# Patient Record
Sex: FEMALE | Race: WHITE | NOT HISPANIC OR LATINO | URBAN - METROPOLITAN AREA
[De-identification: names, ages, dates, MRNs, and addresses within clinical notes are randomized per-mention and may not be internally consistent; named-entity substitution may affect disease eponyms.]

---

## 2017-01-03 ENCOUNTER — HOSPITAL ENCOUNTER (OUTPATIENT)
Dept: PHYSICAL MEDICINE AND REHAB | Facility: CLINIC | Age: 72
Discharge: HOME | End: 2017-01-03
Attending: FAMILY MEDICINE

## 2017-01-03 ENCOUNTER — COMMUNICATION - HEALTHEAST (OUTPATIENT)
Dept: FAMILY MEDICINE | Facility: CLINIC | Age: 72
End: 2017-01-03

## 2017-01-03 DIAGNOSIS — M47.812 FACET ARTHROPATHY, CERVICAL: ICD-10-CM

## 2017-01-03 DIAGNOSIS — M79.18 MYOFASCIAL PAIN: ICD-10-CM

## 2017-01-03 DIAGNOSIS — G47.9 SLEEP DISTURBANCE: ICD-10-CM

## 2017-01-03 DIAGNOSIS — M54.12 CERVICAL RADICULITIS: ICD-10-CM

## 2017-01-03 ASSESSMENT — MIFFLIN-ST. JEOR: SCORE: 1433.02

## 2017-01-06 ENCOUNTER — OFFICE VISIT - HEALTHEAST (OUTPATIENT)
Dept: PHYSICAL THERAPY | Facility: REHABILITATION | Age: 72
End: 2017-01-06

## 2017-01-06 DIAGNOSIS — M54.12 CERVICAL RADICULITIS: ICD-10-CM

## 2017-01-06 DIAGNOSIS — R29.3 ABNORMAL POSTURE: ICD-10-CM

## 2017-01-09 ENCOUNTER — OFFICE VISIT - HEALTHEAST (OUTPATIENT)
Dept: PHYSICAL THERAPY | Facility: REHABILITATION | Age: 72
End: 2017-01-09

## 2017-01-09 DIAGNOSIS — M54.12 CERVICAL RADICULITIS: ICD-10-CM

## 2017-01-09 DIAGNOSIS — R29.3 ABNORMAL POSTURE: ICD-10-CM

## 2017-01-12 ENCOUNTER — OFFICE VISIT - HEALTHEAST (OUTPATIENT)
Dept: PHYSICAL THERAPY | Facility: REHABILITATION | Age: 72
End: 2017-01-12

## 2017-01-12 DIAGNOSIS — M54.12 CERVICAL RADICULITIS: ICD-10-CM

## 2017-01-12 DIAGNOSIS — R29.3 ABNORMAL POSTURE: ICD-10-CM

## 2017-01-13 ENCOUNTER — COMMUNICATION - HEALTHEAST (OUTPATIENT)
Dept: PHYSICAL THERAPY | Facility: REHABILITATION | Age: 72
End: 2017-01-13

## 2017-01-16 ENCOUNTER — OFFICE VISIT - HEALTHEAST (OUTPATIENT)
Dept: PHYSICAL THERAPY | Facility: REHABILITATION | Age: 72
End: 2017-01-16

## 2017-01-16 DIAGNOSIS — R29.3 ABNORMAL POSTURE: ICD-10-CM

## 2017-01-16 DIAGNOSIS — M54.12 CERVICAL RADICULITIS: ICD-10-CM

## 2017-01-17 ENCOUNTER — COMMUNICATION - HEALTHEAST (OUTPATIENT)
Dept: PHYSICAL MEDICINE AND REHAB | Facility: CLINIC | Age: 72
End: 2017-01-17

## 2017-01-18 ENCOUNTER — HOSPITAL ENCOUNTER (OUTPATIENT)
Dept: PHYSICAL MEDICINE AND REHAB | Facility: CLINIC | Age: 72
Discharge: HOME | End: 2017-01-18
Attending: PHYSICAL MEDICINE & REHABILITATION

## 2017-01-18 DIAGNOSIS — M54.2 NECK PAIN ON LEFT SIDE: ICD-10-CM

## 2017-01-20 ENCOUNTER — HOSPITAL ENCOUNTER (OUTPATIENT)
Dept: PHYSICAL MEDICINE AND REHAB | Facility: CLINIC | Age: 72
Discharge: HOME | End: 2017-01-20
Attending: PHYSICIAN ASSISTANT

## 2017-01-20 DIAGNOSIS — M54.2 NECK PAIN ON LEFT SIDE: ICD-10-CM

## 2017-01-20 DIAGNOSIS — G47.9 SLEEP DISTURBANCE: ICD-10-CM

## 2017-01-20 DIAGNOSIS — M47.812 FACET ARTHROPATHY, CERVICAL: ICD-10-CM

## 2017-01-20 DIAGNOSIS — M54.12 CERVICAL RADICULITIS: ICD-10-CM

## 2017-01-23 ENCOUNTER — OFFICE VISIT - HEALTHEAST (OUTPATIENT)
Dept: PHYSICAL THERAPY | Facility: REHABILITATION | Age: 72
End: 2017-01-23

## 2017-01-23 DIAGNOSIS — M54.12 CERVICAL RADICULITIS: ICD-10-CM

## 2017-01-23 DIAGNOSIS — R29.3 ABNORMAL POSTURE: ICD-10-CM

## 2017-01-25 ENCOUNTER — OFFICE VISIT - HEALTHEAST (OUTPATIENT)
Dept: PHYSICAL THERAPY | Facility: REHABILITATION | Age: 72
End: 2017-01-25

## 2017-01-25 DIAGNOSIS — M54.12 CERVICAL RADICULITIS: ICD-10-CM

## 2017-01-25 DIAGNOSIS — R29.3 ABNORMAL POSTURE: ICD-10-CM

## 2017-01-31 ENCOUNTER — HOSPITAL ENCOUNTER (OUTPATIENT)
Dept: PHYSICAL MEDICINE AND REHAB | Facility: CLINIC | Age: 72
Discharge: HOME | End: 2017-01-31
Attending: PHYSICIAN ASSISTANT

## 2017-01-31 DIAGNOSIS — M54.12 CERVICAL RADICULITIS: ICD-10-CM

## 2017-01-31 DIAGNOSIS — M54.2 NECK PAIN ON LEFT SIDE: ICD-10-CM

## 2017-01-31 DIAGNOSIS — G47.9 SLEEP DISTURBANCE: ICD-10-CM

## 2017-01-31 DIAGNOSIS — M79.18 MYOFASCIAL PAIN: ICD-10-CM

## 2017-01-31 DIAGNOSIS — M47.812 FACET ARTHROPATHY, CERVICAL: ICD-10-CM

## 2017-02-03 ENCOUNTER — COMMUNICATION - HEALTHEAST (OUTPATIENT)
Dept: FAMILY MEDICINE | Facility: CLINIC | Age: 72
End: 2017-02-03

## 2017-02-03 ENCOUNTER — HOSPITAL ENCOUNTER (OUTPATIENT)
Dept: PHYSICAL MEDICINE AND REHAB | Facility: CLINIC | Age: 72
Discharge: HOME | End: 2017-02-03
Attending: PHYSICIAN ASSISTANT

## 2017-02-03 ENCOUNTER — AMBULATORY - HEALTHEAST (OUTPATIENT)
Dept: PHYSICAL MEDICINE AND REHAB | Facility: CLINIC | Age: 72
End: 2017-02-03

## 2017-02-03 DIAGNOSIS — M54.2 NECK PAIN: ICD-10-CM

## 2017-02-03 DIAGNOSIS — R52 PAIN: ICD-10-CM

## 2017-02-04 ENCOUNTER — HOSPITAL ENCOUNTER (OUTPATIENT)
Dept: RADIOLOGY | Facility: CLINIC | Age: 72
Discharge: HOME | End: 2017-02-04
Attending: ORTHOPAEDIC SURGERY

## 2017-02-04 DIAGNOSIS — M54.2 NECK PAIN: ICD-10-CM

## 2017-02-06 ENCOUNTER — OFFICE VISIT - HEALTHEAST (OUTPATIENT)
Dept: FAMILY MEDICINE | Facility: CLINIC | Age: 72
End: 2017-02-06

## 2017-02-06 DIAGNOSIS — M81.0 OSTEOPOROSIS: ICD-10-CM

## 2017-02-06 DIAGNOSIS — F33.9 MAJOR DEPRESSIVE DISORDER, RECURRENT EPISODE (H): ICD-10-CM

## 2017-02-06 DIAGNOSIS — M50.223 HERNIATION OF INTERVERTEBRAL DISC AT C6-C7 LEVEL: ICD-10-CM

## 2017-02-06 DIAGNOSIS — E78.2 MIXED HYPERLIPIDEMIA: ICD-10-CM

## 2017-02-06 DIAGNOSIS — Z01.818 PRE-OP EXAM: ICD-10-CM

## 2017-02-06 LAB
ATRIAL RATE - MUSE: 81 BPM
DIASTOLIC BLOOD PRESSURE - MUSE: NORMAL MMHG
INTERPRETATION ECG - MUSE: NORMAL
P AXIS - MUSE: 61 DEGREES
PR INTERVAL - MUSE: 140 MS
QRS DURATION - MUSE: 80 MS
QT - MUSE: 374 MS
QTC - MUSE: 434 MS
R AXIS - MUSE: 14 DEGREES
SYSTOLIC BLOOD PRESSURE - MUSE: NORMAL MMHG
T AXIS - MUSE: 47 DEGREES
VENTRICULAR RATE- MUSE: 81 BPM

## 2017-02-08 ENCOUNTER — AMBULATORY - HEALTHEAST (OUTPATIENT)
Dept: SURGERY | Facility: CLINIC | Age: 72
End: 2017-02-08

## 2017-02-08 DIAGNOSIS — Z98.1 S/P CERVICAL SPINAL FUSION: ICD-10-CM

## 2017-02-09 ENCOUNTER — SURGERY - HEALTHEAST (OUTPATIENT)
Dept: SURGERY | Facility: HOSPITAL | Age: 72
End: 2017-02-09

## 2017-02-09 ENCOUNTER — ANESTHESIA - HEALTHEAST (OUTPATIENT)
Dept: SURGERY | Facility: HOSPITAL | Age: 72
End: 2017-02-09

## 2017-02-09 ASSESSMENT — MIFFLIN-ST. JEOR: SCORE: 1433.02

## 2017-02-13 ENCOUNTER — COMMUNICATION - HEALTHEAST (OUTPATIENT)
Dept: PHYSICAL MEDICINE AND REHAB | Facility: CLINIC | Age: 72
End: 2017-02-13

## 2017-02-15 ENCOUNTER — COMMUNICATION - HEALTHEAST (OUTPATIENT)
Dept: PHYSICAL MEDICINE AND REHAB | Facility: CLINIC | Age: 72
End: 2017-02-15

## 2017-02-15 ENCOUNTER — OFFICE VISIT - HEALTHEAST (OUTPATIENT)
Dept: FAMILY MEDICINE | Facility: CLINIC | Age: 72
End: 2017-02-15

## 2017-02-15 DIAGNOSIS — J02.9 SORE THROAT: ICD-10-CM

## 2017-02-15 DIAGNOSIS — R68.83 CHILLS: ICD-10-CM

## 2017-02-15 DIAGNOSIS — R41.82 ALTERED MENTAL STATUS: ICD-10-CM

## 2017-02-15 DIAGNOSIS — Z98.1 S/P CERVICAL SPINAL FUSION: ICD-10-CM

## 2017-02-16 ENCOUNTER — AMBULATORY - HEALTHEAST (OUTPATIENT)
Dept: PHYSICAL MEDICINE AND REHAB | Facility: CLINIC | Age: 72
End: 2017-02-16

## 2017-02-16 DIAGNOSIS — G89.18 POST-OPERATIVE PAIN: ICD-10-CM

## 2017-02-21 ENCOUNTER — HOSPITAL ENCOUNTER (OUTPATIENT)
Dept: PHYSICAL MEDICINE AND REHAB | Facility: CLINIC | Age: 72
Discharge: HOME | End: 2017-02-21
Attending: PHYSICIAN ASSISTANT

## 2017-02-21 DIAGNOSIS — E55.9 VITAMIN D DEFICIENCY: ICD-10-CM

## 2017-02-21 DIAGNOSIS — G89.18 POST-OPERATIVE PAIN: ICD-10-CM

## 2017-02-21 DIAGNOSIS — Z98.1 S/P CERVICAL SPINAL FUSION: ICD-10-CM

## 2017-02-21 RX ORDER — IBUPROFEN 200 MG
1 CAPSULE ORAL 2 TIMES DAILY
Qty: 200 TABLET | Refills: 4 | Status: SHIPPED | OUTPATIENT
Start: 2017-02-21

## 2017-03-07 ENCOUNTER — COMMUNICATION - HEALTHEAST (OUTPATIENT)
Dept: PHYSICAL MEDICINE AND REHAB | Facility: CLINIC | Age: 72
End: 2017-03-07

## 2017-03-15 ENCOUNTER — RECORDS - HEALTHEAST (OUTPATIENT)
Dept: GENERAL RADIOLOGY | Facility: CLINIC | Age: 72
End: 2017-03-15

## 2017-03-15 DIAGNOSIS — G89.18 OTHER ACUTE POSTPROCEDURAL PAIN: ICD-10-CM

## 2017-03-15 DIAGNOSIS — Z98.1 ARTHRODESIS STATUS: ICD-10-CM

## 2017-03-21 ENCOUNTER — HOSPITAL ENCOUNTER (OUTPATIENT)
Dept: PHYSICAL MEDICINE AND REHAB | Facility: CLINIC | Age: 72
Discharge: HOME | End: 2017-03-21
Attending: PHYSICIAN ASSISTANT

## 2017-03-21 DIAGNOSIS — M54.12 CERVICAL RADICULOPATHY: ICD-10-CM

## 2017-04-04 ENCOUNTER — AMBULATORY - HEALTHEAST (OUTPATIENT)
Dept: FAMILY MEDICINE | Facility: CLINIC | Age: 72
End: 2017-04-04

## 2017-04-10 ENCOUNTER — OFFICE VISIT - HEALTHEAST (OUTPATIENT)
Dept: PHYSICAL THERAPY | Facility: REHABILITATION | Age: 72
End: 2017-04-10

## 2017-04-10 DIAGNOSIS — R29.3 ABNORMAL POSTURE: ICD-10-CM

## 2017-04-10 DIAGNOSIS — Z98.1 S/P CERVICAL SPINAL FUSION: ICD-10-CM

## 2017-04-10 DIAGNOSIS — M53.82 NECK MUSCLE WEAKNESS: ICD-10-CM

## 2017-04-10 DIAGNOSIS — M54.2 ACUTE NECK PAIN: ICD-10-CM

## 2017-04-10 DIAGNOSIS — M81.0 OSTEOPOROSIS: ICD-10-CM

## 2017-04-10 DIAGNOSIS — R29.898 DECREASED ROM OF NECK: ICD-10-CM

## 2017-04-11 ENCOUNTER — OFFICE VISIT - HEALTHEAST (OUTPATIENT)
Dept: FAMILY MEDICINE | Facility: CLINIC | Age: 72
End: 2017-04-11

## 2017-04-11 DIAGNOSIS — E55.9 VITAMIN D DEFICIENCY: ICD-10-CM

## 2017-04-11 DIAGNOSIS — M79.89 LEG SWELLING: ICD-10-CM

## 2017-04-11 ASSESSMENT — MIFFLIN-ST. JEOR: SCORE: 1452.97

## 2017-04-13 ENCOUNTER — OFFICE VISIT - HEALTHEAST (OUTPATIENT)
Dept: PHYSICAL THERAPY | Facility: REHABILITATION | Age: 72
End: 2017-04-13

## 2017-04-13 DIAGNOSIS — M53.82 NECK MUSCLE WEAKNESS: ICD-10-CM

## 2017-04-13 DIAGNOSIS — R29.3 ABNORMAL POSTURE: ICD-10-CM

## 2017-04-13 DIAGNOSIS — Z98.1 S/P CERVICAL SPINAL FUSION: ICD-10-CM

## 2017-04-13 DIAGNOSIS — R29.898 DECREASED ROM OF NECK: ICD-10-CM

## 2017-04-13 DIAGNOSIS — M54.2 ACUTE NECK PAIN: ICD-10-CM

## 2017-04-17 ENCOUNTER — OFFICE VISIT - HEALTHEAST (OUTPATIENT)
Dept: PHYSICAL THERAPY | Facility: REHABILITATION | Age: 72
End: 2017-04-17

## 2017-04-17 DIAGNOSIS — M53.82 NECK MUSCLE WEAKNESS: ICD-10-CM

## 2017-04-17 DIAGNOSIS — Z98.1 S/P CERVICAL SPINAL FUSION: ICD-10-CM

## 2017-04-17 DIAGNOSIS — R29.898 DECREASED ROM OF NECK: ICD-10-CM

## 2017-04-17 DIAGNOSIS — R29.3 ABNORMAL POSTURE: ICD-10-CM

## 2017-04-17 DIAGNOSIS — M54.2 ACUTE NECK PAIN: ICD-10-CM

## 2017-04-20 ENCOUNTER — OFFICE VISIT - HEALTHEAST (OUTPATIENT)
Dept: PHYSICAL THERAPY | Facility: REHABILITATION | Age: 72
End: 2017-04-20

## 2017-04-20 DIAGNOSIS — R29.898 DECREASED ROM OF NECK: ICD-10-CM

## 2017-04-20 DIAGNOSIS — R29.3 ABNORMAL POSTURE: ICD-10-CM

## 2017-04-20 DIAGNOSIS — Z98.1 S/P CERVICAL SPINAL FUSION: ICD-10-CM

## 2017-04-20 DIAGNOSIS — M53.82 NECK MUSCLE WEAKNESS: ICD-10-CM

## 2017-04-20 DIAGNOSIS — M54.2 ACUTE NECK PAIN: ICD-10-CM

## 2017-04-21 ENCOUNTER — RECORDS - HEALTHEAST (OUTPATIENT)
Dept: GENERAL RADIOLOGY | Facility: CLINIC | Age: 72
End: 2017-04-21

## 2017-04-21 DIAGNOSIS — M54.12 RADICULOPATHY, CERVICAL REGION: ICD-10-CM

## 2017-04-24 ENCOUNTER — OFFICE VISIT - HEALTHEAST (OUTPATIENT)
Dept: PHYSICAL THERAPY | Facility: REHABILITATION | Age: 72
End: 2017-04-24

## 2017-04-24 DIAGNOSIS — R29.898 DECREASED ROM OF NECK: ICD-10-CM

## 2017-04-24 DIAGNOSIS — E78.2 MIXED HYPERLIPIDEMIA: ICD-10-CM

## 2017-04-24 DIAGNOSIS — Z98.1 S/P CERVICAL SPINAL FUSION: ICD-10-CM

## 2017-04-24 DIAGNOSIS — M81.0 OSTEOPOROSIS: ICD-10-CM

## 2017-04-24 DIAGNOSIS — M54.2 ACUTE NECK PAIN: ICD-10-CM

## 2017-04-24 DIAGNOSIS — M53.82 NECK MUSCLE WEAKNESS: ICD-10-CM

## 2017-04-24 DIAGNOSIS — R29.3 ABNORMAL POSTURE: ICD-10-CM

## 2017-04-27 ENCOUNTER — OFFICE VISIT - HEALTHEAST (OUTPATIENT)
Dept: PHYSICAL THERAPY | Facility: REHABILITATION | Age: 72
End: 2017-04-27

## 2017-04-27 DIAGNOSIS — M53.82 NECK MUSCLE WEAKNESS: ICD-10-CM

## 2017-04-27 DIAGNOSIS — R29.898 DECREASED ROM OF NECK: ICD-10-CM

## 2017-04-27 DIAGNOSIS — Z98.1 S/P CERVICAL SPINAL FUSION: ICD-10-CM

## 2017-04-27 DIAGNOSIS — M54.2 ACUTE NECK PAIN: ICD-10-CM

## 2017-04-27 DIAGNOSIS — R29.3 ABNORMAL POSTURE: ICD-10-CM

## 2017-05-02 ENCOUNTER — HOSPITAL ENCOUNTER (OUTPATIENT)
Dept: PHYSICAL MEDICINE AND REHAB | Facility: CLINIC | Age: 72
Discharge: HOME | End: 2017-05-02
Attending: PHYSICIAN ASSISTANT

## 2017-05-02 DIAGNOSIS — E55.9 VITAMIN D DEFICIENCY: ICD-10-CM

## 2017-05-02 DIAGNOSIS — Z98.1 S/P CERVICAL SPINAL FUSION: ICD-10-CM

## 2017-05-02 DIAGNOSIS — M54.12 CERVICAL RADICULOPATHY: ICD-10-CM

## 2017-05-02 ASSESSMENT — MIFFLIN-ST. JEOR: SCORE: 1455.7

## 2017-07-31 ENCOUNTER — COMMUNICATION - HEALTHEAST (OUTPATIENT)
Dept: FAMILY MEDICINE | Facility: CLINIC | Age: 72
End: 2017-07-31

## 2017-07-31 DIAGNOSIS — F33.9 EPISODE OF RECURRENT MAJOR DEPRESSIVE DISORDER, UNSPECIFIED DEPRESSION EPISODE SEVERITY (H): ICD-10-CM

## 2017-08-29 ENCOUNTER — RECORDS - HEALTHEAST (OUTPATIENT)
Dept: GENERAL RADIOLOGY | Facility: CLINIC | Age: 72
End: 2017-08-29

## 2017-08-29 DIAGNOSIS — M54.12 RADICULOPATHY, CERVICAL REGION: ICD-10-CM

## 2017-09-13 ENCOUNTER — COMMUNICATION - HEALTHEAST (OUTPATIENT)
Dept: FAMILY MEDICINE | Facility: CLINIC | Age: 72
End: 2017-09-13

## 2018-03-27 ENCOUNTER — COMMUNICATION - HEALTHEAST (OUTPATIENT)
Dept: FAMILY MEDICINE | Facility: CLINIC | Age: 73
End: 2018-03-27

## 2018-03-27 DIAGNOSIS — E78.5 HYPERLIPIDEMIA: ICD-10-CM

## 2018-05-23 ENCOUNTER — AMBULATORY - HEALTHEAST (OUTPATIENT)
Dept: FAMILY MEDICINE | Facility: CLINIC | Age: 73
End: 2018-05-23

## 2018-05-23 ENCOUNTER — OFFICE VISIT - HEALTHEAST (OUTPATIENT)
Dept: FAMILY MEDICINE | Facility: CLINIC | Age: 73
End: 2018-05-23

## 2018-05-23 DIAGNOSIS — Z78.0 POSTMENOPAUSAL: ICD-10-CM

## 2018-05-23 DIAGNOSIS — M81.0 OSTEOPOROSIS: ICD-10-CM

## 2018-05-23 DIAGNOSIS — Z12.31 ENCOUNTER FOR SCREENING MAMMOGRAM FOR MALIGNANT NEOPLASM OF BREAST: ICD-10-CM

## 2018-05-23 DIAGNOSIS — Z00.00 ROUTINE GENERAL MEDICAL EXAMINATION AT A HEALTH CARE FACILITY: ICD-10-CM

## 2018-05-23 DIAGNOSIS — E78.2 MIXED HYPERLIPIDEMIA: ICD-10-CM

## 2018-05-23 DIAGNOSIS — E55.9 VITAMIN D DEFICIENCY: ICD-10-CM

## 2018-05-23 DIAGNOSIS — F33.9 MAJOR DEPRESSIVE DISORDER, RECURRENT EPISODE (H): ICD-10-CM

## 2018-05-23 DIAGNOSIS — Z12.11 SCREEN FOR COLON CANCER: ICD-10-CM

## 2018-05-23 LAB
ALBUMIN SERPL-MCNC: 4.1 G/DL (ref 3.5–5)
ALP SERPL-CCNC: 92 U/L (ref 45–120)
ALT SERPL W P-5'-P-CCNC: 18 U/L (ref 0–45)
ANION GAP SERPL CALCULATED.3IONS-SCNC: 12 MMOL/L (ref 5–18)
AST SERPL W P-5'-P-CCNC: 21 U/L (ref 0–40)
BILIRUB SERPL-MCNC: 0.6 MG/DL (ref 0–1)
BUN SERPL-MCNC: 15 MG/DL (ref 8–28)
CALCIUM SERPL-MCNC: 9.6 MG/DL (ref 8.5–10.5)
CHLORIDE BLD-SCNC: 104 MMOL/L (ref 98–107)
CHOLEST SERPL-MCNC: 199 MG/DL
CO2 SERPL-SCNC: 24 MMOL/L (ref 22–31)
CREAT SERPL-MCNC: 0.83 MG/DL (ref 0.6–1.1)
FASTING STATUS PATIENT QL REPORTED: YES
GFR SERPL CREATININE-BSD FRML MDRD: >60 ML/MIN/1.73M2
GLUCOSE BLD-MCNC: 97 MG/DL (ref 70–125)
HDLC SERPL-MCNC: 68 MG/DL
LDLC SERPL CALC-MCNC: 97 MG/DL
POTASSIUM BLD-SCNC: 5.1 MMOL/L (ref 3.5–5)
PROT SERPL-MCNC: 7 G/DL (ref 6–8)
SODIUM SERPL-SCNC: 140 MMOL/L (ref 136–145)
TRIGL SERPL-MCNC: 170 MG/DL

## 2018-05-23 ASSESSMENT — MIFFLIN-ST. JEOR: SCORE: 1458.42

## 2018-05-24 LAB
25(OH)D3 SERPL-MCNC: 31.8 NG/ML (ref 30–80)
25(OH)D3 SERPL-MCNC: 31.8 NG/ML (ref 30–80)

## 2018-06-01 ENCOUNTER — HOSPITAL ENCOUNTER (OUTPATIENT)
Dept: MAMMOGRAPHY | Facility: CLINIC | Age: 73
Discharge: HOME | End: 2018-06-01
Attending: FAMILY MEDICINE

## 2018-06-01 DIAGNOSIS — Z12.31 ENCOUNTER FOR SCREENING MAMMOGRAM FOR MALIGNANT NEOPLASM OF BREAST: ICD-10-CM

## 2019-02-01 ENCOUNTER — COMMUNICATION - HEALTHEAST (OUTPATIENT)
Dept: FAMILY MEDICINE | Facility: CLINIC | Age: 74
End: 2019-02-01

## 2019-02-01 DIAGNOSIS — E78.2 MIXED HYPERLIPIDEMIA: ICD-10-CM

## 2019-02-04 RX ORDER — SIMVASTATIN 40 MG
TABLET ORAL
Qty: 90 TABLET | Refills: 0 | Status: SHIPPED | OUTPATIENT
Start: 2019-02-04

## 2019-05-18 ENCOUNTER — COMMUNICATION - HEALTHEAST (OUTPATIENT)
Dept: FAMILY MEDICINE | Facility: CLINIC | Age: 74
End: 2019-05-18

## 2019-05-18 DIAGNOSIS — F33.9 MAJOR DEPRESSIVE DISORDER, RECURRENT EPISODE (H): ICD-10-CM

## 2019-05-23 ENCOUNTER — COMMUNICATION - HEALTHEAST (OUTPATIENT)
Dept: FAMILY MEDICINE | Facility: CLINIC | Age: 74
End: 2019-05-23

## 2021-05-28 NOTE — TELEPHONE ENCOUNTER
Due to be seen    Rx renewed per Medication Renewal Policy. Medication was last renewed on 5/23/18.    Liliana Persaud, [facility] Triage/Med Refill 5/20/2019     Requested Prescriptions   Pending Prescriptions Disp Refills     FLUoxetine (PROZAC) 20 MG capsule [Pharmacy Med Name: FLUOXETINE HCL 20 MG CAPSULE] 270 capsule 0     Sig: TAKE 3 BY MOUTH EVERY MORNING       [facility] Refill Protocol  Failed - 5/18/2019  7:38 AM        Failed - Age 21 and younger route to prescribing provider     Last office visit with prescriber/PCP: 4/11/2017 Gina Hays MD [facility]: Visit date not found OR same specialty: 4/11/2017 Gina Hays MD  Last physical: 5/23/2018 Last MTM visit: Visit date not found   Next visit within 3 mo: Visit date not found  Next physical within 3 mo: Visit date not found  Prescriber OR PCP: Gina Hays MD  Last diagnosis associated with med order: 1. Major depressive disorder, recurrent episode (H)  - FLUoxetine (PROZAC) 20 MG capsule [Pharmacy Med Name: FLUOXETINE HCL 20 MG CAPSULE]; TAKE 3 BY MOUTH EVERY MORNING  Dispense: 270 capsule; Refill: 0    If protocol passes may refill for 12 months if within 3 months of last provider visit (or a total of 15 months).             Passed - PCP or prescribing provider visit in last year     Last office visit with prescriber/PCP: 4/11/2017 Gina Hays MD [facility]: Visit date not found OR same specialty: 4/11/2017 Gina Hays MD  Last physical: 5/23/2018 Last MTM visit: Visit date not found   Next visit within 3 mo: Visit date not found  Next physical within 3 mo: Visit date not found  Prescriber OR PCP: Gina Hays MD  Last diagnosis associated with med order: 1. Major depressive disorder, recurrent episode (H)  - FLUoxetine (PROZAC) 20 MG capsule [Pharmacy Med Name: FLUOXETINE HCL 20 MG CAPSULE]; TAKE 3 BY MOUTH EVERY MORNING  Dispense: 270 capsule; Refill: 0    If protocol passes may refill for 12 months  if within 3 months of last provider visit (or a total of 15 months).

## 2021-05-28 NOTE — TELEPHONE ENCOUNTER
Patient overdue for appointment, either schedule annual wellness visit for med check appointment and could do 30-day supply until she is seen.

## 2021-05-29 ENCOUNTER — RECORDS - HEALTHEAST (OUTPATIENT)
Dept: ADMINISTRATIVE | Facility: CLINIC | Age: 76
End: 2021-05-29

## 2021-05-29 NOTE — TELEPHONE ENCOUNTER
Who is calling:  The patient    Reason for Call:  The patient does not need this refill.  The patient [place] out of [place] 11 months ago and is stopping all care through our  Primary Care.      Okay to leave a detailed message: No, no call back is required

## 2021-05-29 NOTE — TELEPHONE ENCOUNTER
Who is calling:  The patient    Reason for Call:  The patient is stopping all care through [facility] in [place].  The patient has moved out of state 11 months ago and has a new provider.  Please stop all calls to the patient.      Okay to leave a detailed message: No, no call back needed

## 2021-05-30 ENCOUNTER — RECORDS - HEALTHEAST (OUTPATIENT)
Dept: ADMINISTRATIVE | Facility: CLINIC | Age: 76
End: 2021-05-30

## 2021-05-30 VITALS — BODY MASS INDEX: 32.6 KG/M2 | WEIGHT: 202 LBS

## 2021-05-30 VITALS — WEIGHT: 202 LBS | BODY MASS INDEX: 32.47 KG/M2 | HEIGHT: 66 IN

## 2021-05-30 VITALS — WEIGHT: 207 LBS | BODY MASS INDEX: 33.27 KG/M2 | HEIGHT: 66 IN

## 2021-05-30 VITALS — BODY MASS INDEX: 32.47 KG/M2 | HEIGHT: 66 IN | WEIGHT: 202 LBS

## 2021-05-30 VITALS — BODY MASS INDEX: 33.89 KG/M2 | WEIGHT: 210 LBS

## 2021-05-30 VITALS — WEIGHT: 210 LBS | BODY MASS INDEX: 33.89 KG/M2

## 2021-05-30 VITALS — WEIGHT: 206.4 LBS | HEIGHT: 66 IN | BODY MASS INDEX: 33.17 KG/M2

## 2021-05-31 ENCOUNTER — RECORDS - HEALTHEAST (OUTPATIENT)
Dept: ADMINISTRATIVE | Facility: CLINIC | Age: 76
End: 2021-05-31

## 2021-06-01 VITALS — WEIGHT: 204.8 LBS | BODY MASS INDEX: 32.15 KG/M2 | HEIGHT: 67 IN

## 2021-06-08 NOTE — PROGRESS NOTES
Assessment:   Ann Padilla is a 71 y.o. y.o. female with past medical history significant for depression, hyperlipidemia, osteoporosis, obesity, vitamin D deficiency who presents today for follow-up regarding left-sided neck pain with radiation into the left upper extremity with associated numbness and tingling.  MRI of the cervical spine shows moderately severe foraminal stenosis on the left at C6-7 secondary to disc osteophyte and facet arthropathy.  There is also moderate left foraminal stenosis at C5-6.  She is status post a left C6-7 transforaminal epidural steroid injection on January 18, 2017 which provided 100% relief of her pain but only lasted for 24 hours.  After that, her pain returned to baseline.  She was actually in the emergency department last night because of the severity of her pain.  She did have weakness in the right triceps and a sensory deficit in the C7 distribution on the left on exam today.  The patient's pain has been refractory to conservative treatment including physical therapy, medical management, and a left C6-7 transforaminal epidural steroid injection.       Plan:     A shared decision making plan was used.  The patient's values and choices were respected.  The following represents what was discussed and decided upon by the physician assistant and the patient.      1.  DIAGNOSTIC TESTS:  Review the MRI of the cervical spine.  No further diagnostic tests were ordered.    2.  PHYSICAL THERAPY: The patient is currently in physical therapy.  I encouraged her to try to do her home exercises at home as tolerated.    3.  MEDICATIONS:  The patient went to the emergency department last night and received new medications.  I recommended that she take those medications as prescribed.  She is prescribed a Medrol Dosepak, diazepam 5 g every 6 hours as needed, and oxycodone 5-10 mg every 4 hours as needed (to replace her hydrocodone/acetaminophen).  -The patient can continue using gabapentin  "300 mg 3 times daily.    4.  INTERVENTIONS:  No further interventions were ordered.  The patient had 100% relief of her pain after a left C6-7 transforaminal epidural steroid injection, but her relief only lasted for 24 hours.    5.  REFERRALS:  I placed a referral for the patient to see Dr. Magana.  Her pain has been refractory to conservative treatment including physical therapy, medical management, and a epidural steroid injection.  In addition, she continues to have weakness in the left triceps and a sensory deficit in the left C7 dermatome.    6.  FOLLOW-UP: I will scheduled any routine follow-up with me.  We will await Dr. Magana's recommendations.  If she has any questions or concerns, she should not hesitate to call.    Subjective:     Ann Padilla is a 71 y.o. female who presents today for follow-up regarding left sided neck pain with radiation into the left upper extremity associated numbness and tingling.  The patient is status post a left C6-7 transforaminal epidural steroid injection on January 18, 2017.  The patient reports that she had 100% relief of her pain for 24 hours after the injection.  Unfortunately, after that her pain returned to baseline and has remained severe.  She is actually in the emergency department last night due to the severity of her pain.  They replaced her Vicodin with oxycodone.  They also prescribed a Medrol Dosepak and they provided a prescription for Valium.    The patient with left-sided neck pain.  The pain radiates into the left scapula.  This is the left shoulder, down the left triceps and into the extensor forearm.  It extends into the hand, involving the second, third, and fourth digits.  She has not been tingling in the same distribution as her pain.  She feels that the left arm is weak.  She rates her pain as an 8 or 10.  At best the 22nd.  At its worst a 10 out of 10.  Her pain is aggravated by \"anything.\"  She is having difficulty sleeping due to the " pain.  She denies any alleviating factors.  The patient also experienced some pain in the anterior chest wall.  She treats this with her physical therapist doing some deep tissue massage in the anterior chest wall and axilla.  That is significantly better today.    The patient is currently in physical therapy.  She had been using hydrocodone/acetaminophen 5/325 mg but will now start oxycodone 5-10 mg.  She is using gabapentin 300 mg 3 times daily.  She uses naproxen 5 mg twice daily.  As mentioned above, she was also prescribed Valium and Medrol Dosepak at the ER last night.    Past medical history is reviewed and is unchanged in the interim.    Family history is reviewed and is unchanged in the interim.    Review of Systems:  Positive for numbness/tingling, weakness, dizziness, blurry vision.  Negative for loss of bowel/bladder control, better, headache, nausea/vomiting, balance changes.     Objective:   CONSTITUTIONAL:  Vital signs as above.  No acute distress.  The patient is well nourished and well groomed.    PSYCHIATRIC:  The patient is awake, alert, oriented to person, place and time.  The patient is answering questions appropriately with clear speech.  Normal affect.  HEENT: Normocephalic, atraumatic.  Sclera clear.  Neck is supple.  SKIN:  Skin over the face, neck bilateral upper extremities is clean, dry, intact without rashes.  MUSCULOSKELETAL: The patient has 4+/5 strength in the left elbow extensors and grasp, otherwise 5/5 strength for the bilateral shoulder abductors, elbow flexors, right elbow extensors, bilateral wrist extensors, right grasp.  Strength is 5/5 in bilateral lower trapezius throat.  NEUROLOGICAL:  2+  Biceps and brachioradialis reflexes, 1+ triceps reflexes bilaterally.  Negative Hagan's bilaterally.  No ankle clonus.  Babinskis negative bilaterally.  Sensation to light touch is diminished in the left C7 dermatome, otherwise intact throughout bilateral upper and lower  extremities.    RESULTS:  MRI of the cervical spine from [facility] dated December 21, 2016 was reviewed. It shows multilevel degenerative changes. At C6-7 there is moderately severe left foraminal stenosis secondary to disc osteophyte complex and facet arthropathy. At C5-6 there is severe right and moderate left foraminal stenosis secondary to advanced disc degeneration with uncovertebral arthrosis. There is moderate inflammatory facet arthropathy on the left at C7-T1. There is interbody and/or facet autofusion at C2-3, C3-4, and C4-5.

## 2021-06-08 NOTE — ANESTHESIA CARE TRANSFER NOTE
Last vitals:   Vitals:    02/09/17 1424   BP: 172/80   Pulse: 77   Resp: 19   Temp: 36.6  C (97.9  F)   SpO2: 99%     Patient's level of consciousness is drowsy  Spontaneous respirations: yes  Maintains airway independently: yes  Dentition unchanged: yes  Oropharynx: oropharynx clear of all foreign objects    QCDR Measures:  ASA# 20 - Surgical Safety Checklist: ASA20A - Safety Checks Done  PQRS# 430 - Adult PONV Prevention: 4558F - Pt received => 2 anti-emetic agents (different classes) preop & intraop  ASA# 8 - Peds PONV Prevention: NA - Not pediatric patient, not GA or 2 or more risk factors NOT present  PQRS# 424 - Mahnaz-op Temp Management: 4559F - At least one body temp DOCUMENTED => 35.5C or 95.9F within required timeframe  PQRS# 426 - PACU Transfer Protocol: - Transfer of care checklist used  ASA# 14 - Acute Post-op Pain: ASA14B - Patient did NOT experience pain >= 7 out of 10    I completed my SBAR handoff to the receiving nurse per policy and procedure.

## 2021-06-08 NOTE — ANESTHESIA PREPROCEDURE EVALUATION
Anesthesia Evaluation        Airway   Mallampati: II  Neck ROM: full   Pulmonary - negative ROS and normal exam                          Cardiovascular - negative ROS and normal exam   Neuro/Psych    (+) depression,     Endo/Other    (+) obesity,      GI/Hepatic/Renal - negative ROS           Dental - normal exam                        Anesthesia Plan  Planned anesthetic: general endotracheal    ASA 2   Induction: intravenous   Anesthetic plan and risks discussed with: patient  Anesthesia plan special considerations: video-assisted,   Post-op plan: routine recovery

## 2021-06-08 NOTE — PROGRESS NOTES
Assessment:   Ann Padilla is a 71 y.o. y.o. female with past medical history significant for depression, hyperlipidemia, osteoporosis, obesity, vitamin D deficiency who presents today for follow-up regarding left-sided neck pain with radiation into the left upper extremity with associated numbness and tingling.  MRI of the cervical spine shows moderately severe foraminal stenosis on the left at C6-7 secondary to a disc osteophyte and facet arthropathy.  There is also moderate left foraminal stenosis at C5-6.  She is status post a left C6-7 transforaminal epidural steroid injection on January 18, 2017 (2 days ago) which provided 100% relief of her pain for 24 hours.  The steroid has not yet started to provide relief.  She returns for a refill of her pain medication.       Plan:     A shared decision making plan was used.  The patient's values and choices were respected.  The following represents what was discussed and decided upon by the physician assistant and the patient.      1.  DIAGNOSTIC TESTS:  I reviewed the MRI of the cervical spine.  No further diagnostic tests were ordered.    2.  PHYSICAL THERAPY: The patient is currently in physical therapy.  I encouraged to continue doing her home exercises.    3.  MEDICATIONS:    -I refilled the patient's hydrocodone/acetaminophen 5/325 mg 1 tab every 4 hours as needed, #15 with no refills.  I checked the [place] prescription monitoring database.  We reviewed the risks and benefits of this medication.  I told the patient I will not refill this medication over the phone.  I will also not provide this medication long-term.  -The patient should continue using gabapentin 300 mg 3 times daily.  -The patient can continue using naproxen 500 mg twice daily as needed.    4.  INTERVENTIONS:  No further interventions were ordered.  I reassured the patient that the steroid typically takes 3-5 days start working and even up to 2-3 weeks for her to reach its peak effect.  She  is currently 2 days out from her injections so I would expect that she start feeling some relief in the next couple of days.    5.  PATIENT EDUCATION:  The patient is in agreement with the above plan.  All questions were answered.    6.  FOLLOW-UP: The patient is scheduled to see me on February 1, 2017 for her 2 week postprocedure follow-up visit.  If she has any questions or concerns in the meantime, she should not hesitate to contact our clinic.    Subjective:     Ann Padilla is a 71 y.o. female who presents today for follow-up regarding neck pain with radiation into the left upper extremity with associated numbness and tingling.  The patient status post a left C6-7 transforaminal epidural steroid injection on January 18, 2017 (2 days ago).  The patient states that she had 24 hours of 100% relief of her pain after the injection.  Over the past day, her pain has returned to baseline.  She returns today for a refill of her pain medication.    The patient was a left sided neck pain.  The pain radiates in the left shoulder, down the left tricep, into the extensor forearm, and extending into the hand, affecting all 5 fingers.  The patient has intermittent numbness and tingling in the same distribution as her pain.  She rates her pain today as a 6 out of 10.  At its best it is a 2010.  At its worse it is a 10 out of 10.  The patient feels that her left arm is weaker than her right.  Her pain is aggravated with trying to sleep at night.  The patient states that she only sleeps for about 3 hours before the pain wakes her up.  Her pain is alleviated temporarily with repositioning, although the pain is constant.  She denies any new symptoms since his last seen.    The patient is currently in physical therapy.  She is using hydrocodone/acetaminophen 5/325 mg 1 tablet every 4 hours.  She is using gabapentin 300 mg 3 times daily.  She is using naproxen 500 mg twice daily.    Past medical history is reviewed and is  unchanged in the interim.    Family history is reviewed and is unchanged in the interim.    Review of Systems:  Positive for numbness/tingling, weakness.  Negative for loss of bowel/bladder control, footdrop, headache, dizziness, nausea vomiting, blurred vision, balance changes.     Objective:   CONSTITUTIONAL:  Vital signs as above.  No acute distress.  The patient is well nourished and well groomed.    PSYCHIATRIC:  The patient is awake, alert, oriented to person, place and time.  The patient is answering questions appropriately with clear speech.  Normal affect.  HEENT: Normocephalic, atraumatic.  Sclera clear.  Neck is supple.  SKIN:  Skin over the face, neck bilateral upper extremities is clean, dry, intact without rashes.  MUSCULOSKELETAL: The patient has 4+/5 strength for the left elbow extensors, otherwise 5/5 strength for the bilateral shoulder abductors, elbow flexors, right elbow extensors, bilateral wrist extensors, finger flexors/abductors.   NEUROLOGICAL:   Sensation to light touch is subjectively altered/diminished of left hand including all 5 fingers.    RESULTS:  MRI of the cervical spine from [facility] dated December 21, 2016 was reviewed. It shows multilevel degenerative changes. At C6-7 there is moderately severe left foraminal stenosis secondary to disc osteophyte complex and facet arthropathy. At C5-6 there is severe right and moderate left foraminal stenosis secondary to advanced disc degeneration with uncovertebral arthrosis. There is moderate inflammatory facet arthropathy on the left at C7-T1. There is interbody and/or facet autofusion at C2-3, C3-4, and C4-5.

## 2021-06-08 NOTE — PROGRESS NOTES
"ASSESSMENT:   1. Altered mental status     2. S/P cervical spinal fusion     3. Sore throat     4. Chills          PLAN:  Discussed with patient and her family, she requires a more thorough evaluation than can be done in the clinic, thus recommend she be seen in the Emergency Room to fully evaluate her AMS.  Additionally, I think there is a possibility of admission for pain control that does not cause AMS and, possibly, evaluation by her spinal surgeon.  Patient was transported to [facility] ER by her family via personal automobile. Report was called to Dr. Werner.  Patient/family agreeable to plan.     SUBJECTIVE:   Ann Padilla is a 71 y.o. female presents today with 6 days complaint of \"seeming out of sorts\", worsening for the past 48 hours. She feels like she can't keep her mind straight and can't recall things that occurred earlier in the day.  States she starts saying things but they aren't what she's actually thinking.  She feels like her vision has intermittently been blurry though she is so tired she can hardly open her eyes.  Her  has noticed speech seems slurred and more \"mumbled\" for the past 48 hours.  She has been extremely fatigued - hardly able to keep her eyes open for the past 48 hours.  She also complains of fatigue, chills, and sore throat. She had a cervical fusion done 2/9/17. Her son had contacted her surgeon's office today as he was concerned that his mother has \"been in a fog\" since her surgery. She was recommended today, by the surgeon's office, to stop the diazepam and reduce oxycodone to 1 tablet q6h.  However, her  brought her in today due to her declining mental status.  She has had numbness and weakness of her left upper extremity, though that was present prior to surgery and is actually improved since surgery.    Denies headache. Denies falls or head injury. Denies dysuria, hematuria, polyuria, flank pain, abdominal pain, nausea, vomiting. Denies fever. Denies nasal " congestion and cough. No shortness of breath.     Patient Active Problem List   Diagnosis     Major Depression, Recurrent     Obesity     Osteoporosis     Mixed Hyperlipoproteinemia     Vitamin D Deficiency     Cervical radiculopathy       History   Smoking Status     Never Smoker   Smokeless Tobacco     Not on file       Current Medications:  Current Outpatient Prescriptions on File Prior to Visit   Medication Sig Dispense Refill     diazePAM (VALIUM) 5 MG tablet Take 1 tablet (5 mg total) by mouth every 8 (eight) hours as needed for anxiety or muscle spasms. 30 tablet 0     FLUoxetine (PROZAC) 20 MG capsule TAKE 3 CAPSULES BY MOUTH EVERY MORNING 270 capsule 1     oxyCODONE (ROXICODONE) 5 MG immediate release tablet Take 1-2 tablets (5-10 mg total) by mouth every 4 (four) hours as needed. 90 tablet 0     simvastatin (ZOCOR) 40 MG tablet TAKE 1 TABLET BY MOUTH EVERY DAY AT BEDTIME 90 tablet 1     cholecalciferol, vitamin D3, 1,000 unit tablet Take 4,000 Units by mouth daily.       simvastatin (ZOCOR) 40 MG tablet TAKE ONE TABLET BY MOUTH ONCE DAILY AT BEDTIME 90 tablet 1     No current facility-administered medications on file prior to visit.        Allergies:   No Known Allergies    OBJECTIVE:   Vitals:    02/15/17 1340 02/15/17 1343   BP: 156/80 155/77   Pulse: 93 92   Resp: 20 22   Temp: 98.3  F (36.8  C)    TempSrc: Oral    SpO2: 92% 93%     Physical exam reveals a  71 y.o. female.   She is fatigued and she closes her eyes throughout the history-taking and exam.  She asks her  to answer questions as she cannot keep her eyes open.  Lungs: Chest is clear, no wheezing, rhonchi or rales. Symmetric air entry throughout both lung fields.  Heart: regular rate and rhythm, no murmur, rub or gallop

## 2021-06-08 NOTE — PROGRESS NOTES
ASSESSMENT: Ann Padilla is a 71 y.o. female with past medical history significant for depression, hyperlipidemia, osteoporosis, obesity, vitamin D deficiency who presents today for new patient evaluation of a one-month history of left-sided neck pain with radiation into the left upper extremity with associated numbness and tingling.  MRI of the cervical spine shows moderately severe foraminal stenosis on the left at C6-7 secondary to a disc osteophyte and facet arthropathy.  There is also moderate left foraminal stenosis at C5-6.  Her pain more closely follows the C7 dermatome.  She was neurologically intact today other than a sensory deficit in the left hand.    NDI:  24  WHO 5: 19    PLAN:  A shared decision making model was used.  The patient's values and choices were respected.  The following represents what was discussed and decided upon by the physician assistant and the patient.      1.  DIAGNOSTIC TESTS:  I reviewed the MRI of the cervical spine.  No further diagnostic test were ordered.    2.  PHYSICAL THERAPY:  Discussed the importance of core strengthening, ROM, stretching exercises with the patient and how each of these entities is important in decreasing pain.  Explained to the patient that the purpose of physical therapy is to teach the patient a home exercise program.  These exercises need to be performed every day in order to decrease pain and prevent future occurrences of pain.   I placed an order for patient to begin physical therapy at the [facility] location of [facility] rehab.    3.  MEDICATIONS:    -Gabapentin 100 mg was prescribed.  She is given the dosage titration chart.  She did increase her dose to maximum of 300 mg 3 times daily.  -Naproxen 5 mg twice daily as needed with food was also prescribed.  -The patient requested a refill of [place] from her primary care provider earlier this morning.  If her primary care provider does not feel comfortable refilling the prescription, I did tell  the patient to call us back and I would provide a prescription for hydrocodone/acetaminophen 5/325 mg 1 tablet every 8 hours as needed, #30 with no refills.  I did check the [place] prescription monitor database and she has not received any other opiate prescriptions, aside from the one from her PCP on December 21.  -The patient completed 12 days of prednisone.    4.  INTERVENTIONS:  No interventions were ordered today.  We will see how the patient does with physical therapy and gabapentin.  The patient fails to improve, I would recommend a left C6-7 transforaminal epidural steroid injection initially.  If that does not help, we could consider a left C5-6 transforaminal epidural steroid injection.    5.  PATIENT EDUCATION: I did tell the patient is on the need neck surgery conservative treatment.  The patient for now, her exam is reassuring, so we will try to treat her pain conservatively.  She is in agreement with this plan.  All questions were answered.    6.  FOLLOW-UP:   I will see the patient back in the clinic in 2-3 weeks to follow-up.  If she has any questions or concerns in the meantime, she should not hesitate to contact our clinic.        SUBJECTIVE:  Ann Padilla  Is a 71 y.o. female who presents today in consultation at the request of Dr. Almendarez for new patient evaluation of neck pain with radiation into the left upper extremity.  The patient states that she began to have pain about 1 month ago.  She denies any injury or event to cause the pain.  She tried going to see her chiropractor but was not improving, so her chiropractor recommended that she get an MRI of her cervical spine.  She was seen at her primary care provider's clinic.  They prescribed a 12 day course of prednisone.  The patient states that she has completed that course and does not feel it provided any lasting benefit.  They also ordered an MRI of her cervical spine and then referred her to our clinic for further evaluation and  treatment.    The patient was a left-sided neck pain.  The pain radiates into the shoulder blade.  It extends into the shoulder, down the triceps, into the extensor forearm, and into the dorsal aspect of her hand.  The patient states that she is tingling in the same distribution as her pain which extends into the fingers.  She states that all 5 fingers are affected.  The patient describes the pain as throbbing, deep pain.  She states that she has difficulty getting comfortable due to the pain.  She is not having any headaches.  She denies weakness.  She denies any right-sided symptoms.  She denies loss of bowel or bladder control.  She denies any recent fevers, chills, or sweats.  She denies any difficulty with balance or fine motor.  The patient's pain tends to be aggravated with trying to sleep at night.  It is also aggravated with driving.  Her pain is alleviated with applying heat.  She also states that the most comfortable position for her is laying flat on her back with her left arm at her side.    As mentioned above, the patient went to her chiropractor but did not get relief.  She has not had physical therapy for her neck.  She does not have any history of cervical spine surgery for cervical injections.  The patient did have a low back surgery in 1984 which went well.  As mentioned above, the patient completed course of prednisone.  She ran out of her hydrocodone/acetaminophen 5/25 mg.  She had been taking this 3 times daily.  She called her primary care provider's office today to request a refill.  The patient has also been using ibuprofen as needed.    Current Outpatient Prescriptions on File Prior to Encounter   Medication Sig Dispense Refill     alendronate (FOSAMAX) 70 MG tablet Take 1 tablet (70 mg total) by mouth every 7 days. Take in the morning on an empty stomach with a full glass of water 30 minutes before food 13 tablet 3     cholecalciferol, vitamin D3, 1,000 unit tablet Take 4,000 Units by  mouth daily.       FLUoxetine (PROZAC) 20 MG capsule TAKE 3 CAPSULES BY MOUTH EVERY MORNING 270 capsule 1     predniSONE (DELTASONE) 10 MG tablet Take 4 tab qday for 3 d, 3 tab qday for 3 d, 2 tab qday for 3d, 1 tab qday for 3d, then stop 30 tablet 0     simvastatin (ZOCOR) 40 MG tablet TAKE 1 TABLET BY MOUTH EVERY DAY AT BEDTIME 90 tablet 1     simvastatin (ZOCOR) 40 MG tablet TAKE ONE TABLET BY MOUTH ONCE DAILY AT BEDTIME 90 tablet 1       No Known Allergies    Past Medical History   Diagnosis Date     Benign adenomatous polyp of large intestine 8/7/2008     removed at colonoscopy     Closed patellar sleeve fracture of right knee 11/21/2013     resolved     Osteopenia 10/2016     repeat dexa in 2 yrs        Patient Active Problem List   Diagnosis     Major Depression, Recurrent     Obesity     Osteoporosis     Mixed Hyperlipoproteinemia     Vitamin D Deficiency       Past Surgical History   Procedure Laterality Date     Hemilaminotomy lumbar spine  1983       Family History   Problem Relation Age of Onset     Coronary artery disease Father 60     had CABG     Alzheimer's disease Father      Hip fracture Mother      Cancer Maternal Grandfather 86     Stomach     Social history: The patient is .  She is retired.  She drinks wine occasionally.  She denies tobacco use.  She denies illicit drug use.    ROS:  Positive for swelling of feet, anxiety, indigestion, back pain, depression/worry.  Specifically negative for dysphagia, imbalance, fine motor skill difficulties, bowel/bladder dysfunction, fevers,chills, appetite changes, unexplained weight loss.   Otherwise 13 systems reviewed are negative.  Please see the patient's intake questionnaire from today for details.      OBJECTIVE:  PHYSICAL EXAMINATION:  CONSTITUTIONAL:  Vital signs as above.  The patient appears uncomfortable but is otherwise in no acute distress.  The patient is well nourished and well groomed.  PSYCHIATRIC:  The patient is awake, alert,  oriented to person, place, time and answering questions appropriately with clear speech.    HEENT:  Normocephalic, atraumatic.  Sclera clear.    SKIN:  Skin over the face, bilateral upper extremities, and neck is clean, dry, intact without rashes.  LYMPH NODES:  No palpable or tender anterior/posterior cervical, submandibular, or supraclavicular lymph nodes.    MUSCLE STRENGTH:  5/5 strength for the bilateral shoulder abductors, elbow flexors/extensors, wrist extensors, finger flexors/abductors.  NEURO:  CN III-XII are grossly intact.  2/4 symmetric biceps, brachioradialis, triceps reflexes bilaterally.  Sensation to touch is subjectively diminished in the left hand, affecting all 5 fingers.  Negative Hagan's bilaterally.  Negative Babinski's bilaterally.  No ankle clonus.  VASCULAR:  2/4 radial pulses bilaterally.  Warm upper limbs bilaterally.  Capillary refill in the upper extremities is less than 1 second.  MUSCULOSKELETAL:  Cervical range of motion is restricted with extension and lateral rotation to the left.  The patient and a positive Spurling sign on the left.  She had hypertonicity of the left upper trapezius muscle.  Range of motion of the left shoulder was full.    RESULTS:  MRI of the cervical spine from [facility] dated December 21, 2016 was reviewed.  It shows multilevel degenerative changes.  At C6-7 there is moderately severe left foraminal stenosis secondary to disc osteophyte complex and facet arthropathy.  At C5-6 there is severe right and moderate left foraminal stenosis secondary to advanced disc degeneration with uncovertebral arthrosis.  There is moderate inflammatory facet arthropathy on the left at C7-T1.  There is interbody and/or facet autofusion at C2-3, C3-4, and C4-5.

## 2021-06-08 NOTE — PROGRESS NOTES
Spoke with patient's  this morning.  Patient is feeling much better.  Sedation/confusion resolving. Current regimen is oxycodone q 6hrs. Valium discontinued.  i am going to add a medrol pack to assist with pain control and perhaps allow her to get by with even less narcotic.  Patients  states he is in agreement with the plan.  They are please with the progress and will call should anything come up.

## 2021-06-08 NOTE — ANESTHESIA POSTPROCEDURE EVALUATION
Patient: Ann Padilla  ANTERIOR CERVICAL DECOMPRESSION FUSION C5-7  Anesthesia type: general    Patient location: PACU  Last vitals:   Vitals:    02/09/17 1520   BP: 121/58   Pulse: 91   Resp: 13   Temp:    SpO2: 95%     Post vital signs: stable  Level of consciousness: awake and responds to simple questions  Post-anesthesia pain: pain controlled  Post-anesthesia nausea and vomiting: no  Pulmonary: unassisted, return to baseline  Cardiovascular: stable and blood pressure at baseline  Hydration: adequate  Anesthetic events: no    QCDR Measures:  ASA# 11 - [place]-op Cardiac Arrest: ASA11B - Patient did NOT experience unanticipated cardiac arrest  ASA# 12 - Mahnaz-op Mortality Rate: ASA12B - Patient did NOT die  ASA# 13 - PACU Re-Intubation Rate: ASA13B - Patient did NOT require a new airway mgmt  ASA# 10 - Composite Anes Safety: ASA10A - No serious adverse event  ASA# 38 - New Corneal Injury: ASA38A - No new exposure keratitis or corneal abrasion in PACU    Additional Notes:

## 2021-06-08 NOTE — PROGRESS NOTES
[facility] SPINE SURGERY SERVICE OFFICE VISIT    2/3/2017     Ann Padilla is a 71 y.o. female who is sent to us in consultation by Trisha Pollock  for evaluation of severe cervical radiculopathy with left upper extremity symptoms         HPI: Ann Padilla is a 71 y.o. y.o. female referred by Trisha Pollockwith past medical history significant for depression, hyperlipidemia, osteoporosis, obesity, vitamin D deficiency who presents today for a surgical consultation regarding left-sided neck pain with radiation into the left upper extremity with associated numbness and tingling. MRI of the cervical spine shows moderately severe foraminal stenosis on the left at C6-7 secondary to a disc osteophyte and facet arthropathy. There is also moderate left foraminal stenosis at C5-6. She is status post a left C6-7 transforaminal epidural steroid injection on January 18, 2017 which provided 100% relief of her pain for 24 hours.  She reports that the pain is constant and stabbing and really nothing is relieving it although lying down makes her feel somewhat better.  Narcotic medications are hardly touching her pain symptoms.  She would like to move forward with surgery as soon as possible.    Past Medical History:   Diagnosis Date     Benign adenomatous polyp of large intestine 8/7/2008    removed at colonoscopy     Closed patellar sleeve fracture of right knee 11/21/2013    resolved     Osteopenia 10/2016    repeat dexa in 2 yrs     Past Surgical History:   Procedure Laterality Date     HEMILAMINOTOMY LUMBAR SPINE  1983         REVIEW OF SYSTEMS:  ROS reviewed with pt as documented on pt health form of 2/3/2017.    Negative cardiac, pulmonary, hematological.  No family hx of anesthetic reactions.  No family hx of hypercoagulability.       MEDICATIONS:  Current Outpatient Prescriptions   Medication Sig Dispense Refill     alendronate (FOSAMAX) 70 MG tablet Take 1 tablet (70 mg total) by mouth every 7 days. Take in the  morning on an empty stomach with a full glass of water 30 minutes before food 13 tablet 3     cholecalciferol, vitamin D3, 1,000 unit tablet Take 4,000 Units by mouth daily.       diazePAM (VALIUM) 5 MG tablet Take 1 tablet (5 mg total) by mouth every 6 (six) hours as needed for muscle spasms. 20 tablet 0     FLUoxetine (PROZAC) 20 MG capsule TAKE 3 CAPSULES BY MOUTH EVERY MORNING 270 capsule 1     gabapentin (NEURONTIN) 100 MG capsule Take 100 mg by mouth daily. Increase dose as directed by chart.  May increase to 3 tabs 3 times daily 180 capsule 2     HYDROcodone-acetaminophen (NORCO) 5-325 mg per tablet Take 1 tablet by mouth every 4 (four) hours as needed for pain. 15 tablet 0     methylPREDNISolone (MEDROL, KARAN,) 4 mg tablet follow package directions 21 tablet 0     naproxen (EC NAPROSYN) 500 MG EC tablet Take 1 tablet (500 mg total) by mouth 2 (two) times a day as needed. Take with food. 60 tablet 0     oxyCODONE (ROXICODONE) 5 MG immediate release tablet Take 1-2 tablets (5-10 mg total) by mouth every 4 (four) hours as needed for pain. 60 tablet 0     predniSONE (DELTASONE) 10 MG tablet Take 4 tab qday for 3 d, 3 tab qday for 3 d, 2 tab qday for 3d, 1 tab qday for 3d, then stop 30 tablet 0     simvastatin (ZOCOR) 40 MG tablet TAKE 1 TABLET BY MOUTH EVERY DAY AT BEDTIME 90 tablet 1     simvastatin (ZOCOR) 40 MG tablet TAKE ONE TABLET BY MOUTH ONCE DAILY AT BEDTIME 90 tablet 1     No current facility-administered medications for this encounter.          ALLERGIES/SENSITIVITIES:     No Known Allergies    PERTINENT SOCIAL HISTORY:   Social History     Social History     Marital status:      Spouse name: N/A     Number of children: N/A     Years of education: N/A     Social History Main Topics     Smoking status: Never Smoker     Smokeless tobacco: Not on file     Alcohol use Yes      Comment: 1 glass wine once a week     Drug use: No     Sexual activity: Not on file     Other Topics Concern     Not on file      Social History Narrative         FAMILY HISTORY:  Family History   Problem Relation Age of Onset     Coronary artery disease Father 60     had CABG     Alzheimer's disease Father      Hip fracture Mother      Cancer Maternal Grandfather 86     Stomach        PHYSICAL EXAM:   Constitutional:   Visit Vitals     /76     Pulse 71     SpO2 96%        Mental Status: A & O in no acute distress.  Affect is appropriate.  Speech is fluent.  Recent and remote memory are intact.  Attention span and concentration are normal.     Observation: Grossly intact    Range of Motion:  Less than 25% in left rotation and left lateral bending    Provacative Testing: Nearly positive Spurling's maneuver towards the left     Motor: 4/5 triceps and wrist flexion on the left     Sensory: Decreased sensation C6-C7 left     Gait Pattern:  Normal     Coordination:  Heel/toe/  gait intact.       Reflexes; supinator, biceps intact.  Triceps reflex absent left Negative hoffmans   negative   babinski/ clonus.    IMAGING: I personally reviewed all radiographic images    MRI  At C6-7 there is moderately severe left foraminal stenosis secondary to disc osteophyte complex and facet arthropathy. At C5-6 there is severe right and moderate left foraminal stenosis secondary to advanced disc degeneration with uncovertebral arthrosis.        CONSULTATION ASSESSMENT AND PLAN:      Patient presents today for a complex spine surgery evaluation.  A shared treatment decision was performed alongside the patient.  We discussed the possibility of cervical decompression versus anterior cervical decompression and fusion.  Both of the procedures were outlined with the patient and patient is choosing to move forward with an anterior cervical decompression and fusion and certainly this is indicated given her significant neurologic deficit in her left upper extremity.  Recommending surgery as soon as possible given the amount of weakness that she is exhibiting.   Risks and benefits were extensively discussed. We discussed risks of the anterior cervical fusion including but not limited to spinal cord injury, nerve root damage, spinal fluid leakage, infection, bleeding, recurrent laryngeal nerve, soft tissue injury, difficulty swallowing, chronic pain syndromes, and other unforeseen complications.  If he has any other questions or concerns he certainly not hesitate to contact our surgical team and the [facility] at any time.  Patient was specifically instructed on pre-operative and scheduling protocol and was given the appropriate contact information for the surgery scheduler and the general information line.    Surgical plan:  Anterior cervical decompression and fusion C5-C7 with instrumentation      Bradley Magana MD  Spine Surgeon  [facility]      Cc:   Michele Almendarez MD  [id number] [facility]  [address] [address]

## 2021-06-08 NOTE — PROGRESS NOTES
Assessment/Plan:      Visit for Preoperative Exam.    1. Pre-op exam  Basic Metabolic Panel    Electrocardiogram Perform and Read    XR Chest PA and Lateral    HM2(CBC w/o Differential)   2. Herniation of intervertebral disc at C6-C7 level     3. Mixed Hyperlipoproteinemia     4. Osteoporosis     5. Major depressive disorder, recurrent episode       Chest x-ray is normal  EKG is normal sinus rhythm  Labs are pending at time of dictation    Patient with well controlled hyperlipidemia.    Patient approved for surgery with general or local anesthesia. Postoperative pain to be managed by surgeon during post-operative [employer] timeframe, typically 30-60 days for major surgery, and less for others. Postoperative Care will be managed by Hospital Service. Labs will be done as indicated. Copy of the pre-op was given to the patient to bring along on the day of surgery. Follow up as needed. Low-moderate Risk Surgery.     Subjective:     Scheduled Procedure: C 6-7 decompression and fusion  Surgery Date:  2/9/17  Surgery Location:  [place]  Surgeon:  Dr. Magana    History of present illness:  Patient with history of prior lumbar back surgery, now with C6-7 disc herniation affecting her upper extremities bilaterally.  Patient needs decompression and fusion.        Current Outpatient Prescriptions   Medication Sig Dispense Refill     alendronate (FOSAMAX) 70 MG tablet Take 1 tablet (70 mg total) by mouth every 7 days. Take in the morning on an empty stomach with a full glass of water 30 minutes before food 13 tablet 3     cholecalciferol, vitamin D3, 1,000 unit tablet Take 4,000 Units by mouth daily.       diazePAM (VALIUM) 5 MG tablet Take 1 tablet (5 mg total) by mouth every 6 (six) hours as needed for muscle spasms. 20 tablet 0     FLUoxetine (PROZAC) 20 MG capsule TAKE 3 CAPSULES BY MOUTH EVERY MORNING 270 capsule 1     gabapentin (NEURONTIN) 100 MG capsule Take 100 mg by mouth daily. Increase dose as  directed by chart.  May increase to 3 tabs 3 times daily 180 capsule 2     HYDROcodone-acetaminophen (NORCO) 5-325 mg per tablet Take 1 tablet by mouth every 4 (four) hours as needed for pain. 15 tablet 0     methylPREDNISolone (MEDROL, KARAN,) 4 mg tablet follow package directions 21 tablet 0     naproxen (EC NAPROSYN) 500 MG EC tablet Take 1 tablet (500 mg total) by mouth 2 (two) times a day as needed. Take with food. 60 tablet 0     oxyCODONE (ROXICODONE) 5 MG immediate release tablet Take 1-2 tablets (5-10 mg total) by mouth every 4 (four) hours as needed for pain. 60 tablet 0     predniSONE (DELTASONE) 10 MG tablet Take 4 tab qday for 3 d, 3 tab qday for 3 d, 2 tab qday for 3d, 1 tab qday for 3d, then stop 30 tablet 0     simvastatin (ZOCOR) 40 MG tablet TAKE 1 TABLET BY MOUTH EVERY DAY AT BEDTIME 90 tablet 1     simvastatin (ZOCOR) 40 MG tablet TAKE ONE TABLET BY MOUTH ONCE DAILY AT BEDTIME 90 tablet 1     No current facility-administered medications for this visit.        No Known Allergies    Immunization History   Administered Date(s) Administered     DT (pediatric) 03/28/2006     Influenza high dose, seasonal 09/16/2015     Influenza, inj, historic 10/22/2008     Influenza, seasonal,quad inj 6-35 mos 10/12/2010, 08/30/2012, 09/17/2013     Pneumo Conj 13-V (2010&after) 10/22/2015     Pneumo Polysac 23-V 06/20/2012     Td, historic 03/28/2006, 06/20/2012     Tdap 06/20/2012     ZOSTER 08/30/2012       Patient Active Problem List   Diagnosis     Major Depression, Recurrent     Obesity     Osteoporosis     Mixed Hyperlipoproteinemia     Vitamin D Deficiency       Past Medical History:   Diagnosis Date     Benign adenomatous polyp of large intestine 8/7/2008    removed at colonoscopy     Closed patellar sleeve fracture of right knee 11/21/2013    resolved     Osteopenia 10/2016    repeat dexa in 2 yrs       Social History     Social History     Marital status:      Spouse name: N/A     Number of children:  N/A     Years of education: N/A     Occupational History     Not on file.     Social History Main Topics     Smoking status: Never Smoker     Smokeless tobacco: Not on file     Alcohol use Yes      Comment: 1 glass wine once a week     Drug use: No     Sexual activity: Not on file     Other Topics Concern     Not on file     Social History Narrative       Past Surgical History:   Procedure Laterality Date     HEMILAMINOTOMY LUMBAR SPINE  1983         Recent Health  Fever: no  Chills: no  Fatigue: yes  Chest Pain: no  Cough: no  Dyspnea: no  Urinary Frequency: yes, lot of water drinking  Nausea: no  Vomiting: no  Diarrhea: no  Abdominal Pain: no  Easy Bruising: no  Lower Extremity Swelling: yes, feet and ankles  Poor Exercise Tolerance: yes    Most recent Health Maintenance Visit:  14 month(s) ago    Pertinent History  Prior Anesthesia: yes  Previous Anesthesia Reaction:  no  Diabetes: no  Cardiovascular Disease: no  Pulmonary Disease: no  Renal Disease: no  GI Disease: no  Sleep Apnea: no  Thromboembolic Problems: no  Clotting Disorder: no  Bleeding Disorder: no  Transfusion Reaction: no  Impaired Immunity: no  Steroid use in the last 6 months: yes  Frequent Aspirin use: no    Family history of none    Social history of patient does not wear denture or partial plates, there is no transfusion refusal and there are no concerns regarding care after surgery    After surgery, the patient plans to recover at home with family.    Review of Systems  Review of Systems   General ROS: negative  Psychological ROS: negative  Ophthalmic ROS: negative  ENT ROS: negative  Allergy and Immunology ROS: negative  Hematological and Lymphatic ROS: negative  Endocrine ROS: negative  Breast ROS: negative  Respiratory ROS: negative  Cardiovascular ROS: negative  Gastrointestinal ROS: negative  Genito-Urinary ROS: negative  Musculoskeletal ROS: positive for - pain in neck - bilateral  Neurological ROS: positive for -  numbness/tingling  Dermatological ROS: negative            Objective:         Vitals:    02/06/17 1023   BP: 120/80   Pulse: 90   Temp: 97.7  F (36.5  C)       Physical Exam:  Physical Exam   General appearance - alert, well appearing, and in no distress and obese  Mental status - normal mood, behavior, speech, dress, motor activity, and thought processes, drowsy  Eyes - pupils equal and reactive, extraocular eye movements intact, sclera anicteric  Ears - bilateral TM's and external ear canals normal  Nose - normal and patent, no erythema, discharge or polyps  Mouth - mucous membranes moist, pharynx normal without lesions  Neck - supple, no significant adenopathy, carotids upstroke normal bilaterally, no bruits, thyroid exam: thyroid is normal in size without nodules or tenderness  Lymphatics - no palpable lymphadenopathy, no hepatosplenomegaly  Chest - clear to auscultation, no wheezes, rales or rhonchi, symmetric air entry  Heart - normal rate and regular rhythm, S1 and S2 normal, no murmurs noted  Abdomen - soft, nontender, nondistended, no masses or organomegaly  bowel sounds hyperactive  Breasts - not examined  Pelvic - examination not indicated  Back exam - full range of motion, no tenderness, palpable spasm or pain on motion  Neurological - alert, oriented, normal speech, no focal findings or movement disorder noted, cranial nerves II through XII intact, DTR's normal and symmetric  Musculoskeletal - no joint tenderness, deformity or swelling  Extremities - peripheral pulses normal, no clubbing or cyanosis, pedal edema 1 +  Skin - normal coloration and turgor, no rashes, no suspicious skin lesions noted

## 2021-06-09 NOTE — PROGRESS NOTES
[facility]   Cervical Thoracic Initial Evaluation    Patient Name: Ann Padilla  Date of evaluation: 4/10/2017  Referral Diagnosis: Cervical radiculopathy  Referring provider: Kam Jimenez PA-C  Visit Diagnosis:     ICD-10-CM    1. Acute neck pain M54.2    2. S/P cervical spinal fusion Z98.1    3. Neck muscle weakness M53.82    4. Decreased ROM of neck R29.898    5. Abnormal posture R29.3        Assessment:      Pt. is appropriate for skilled PT intervention as outlined in the Plan of Care (POC).    Goals:  Pt. will demonstrate/verbalize independence in self-management of condition in : 6 weeks  Pt. will improve posture : and demonstrate posture with minimal to no cuing;in 6 weeks  Patient Turn Head: for driving;with partial ROM;with less pain;with less difficulty;in 6 weeks  Patient will decrease : NDI score;for improved quality of life;in 6 weeks  Pt will: perform housework and ADL's with increased ease and strength and decreased pain; in 6 weeks     Patient's expectations/goals are realistic.    Barriers to Learning or Achieving Goals:  No Barriers.       Plan / Patient Instructions:        Plan of Care:   Communication with: Referral Source  Patient Related Instruction: Nature of Condition;Treatment plan and rationale;Self Care instruction;Basis of treatment;Body mechanics;Posture;Expected outcome  Times per Week: 2  Number of Weeks: 6  Number of Visits: 12 - PRN   Discharge Planning: Pt will be discharged upon meeting goals or plateau of progress  Therapeutic Exercise: ROM;Stretching;Strengthening  Neuromuscular Reeducation: kinesio tape;posture;core  Manual Therapy: soft tissue mobilization;myofascial release;joint mobilization;muscle energy;strain counterstrain  Modalities: electrical stimulation;TENS;cold pack;hot pack    Plan for next visit: Focus on strength and mobility and add MT when pt is ready.       Subjective:         History of Present Illness:    Ann is a 71 y.o. female who  presents to therapy today with complaints of neck pain after an anterior cervical disc fusion (ACDF) on 17 by Dr. Magana. Date of onset/duration of symptoms is 17. Onset was post surgical. Symptoms are getting better. She denies history of similar symptoms. She describes their previous level of function as not limited    Pt reports that recovery has been slow but finally she feels like she has turned the corner.  Pt reports that she has now been off the pain medications for a week and has been taking Tylenol.      Pt reports that since the surgery she has had swelling down into her lower legs and feet and that has not gone away yet and is her main concern.      Prior to surgery she was having left cervical pain and radicular sx into the left UE.  Pt tried conservative management but nothing worked and so had surgery.  Pt no longer has any radicular sx.      Pt reports that she has not worn her big neck brace for about a month but she uses a foam brace occasionally because her neck gets tired.   Pt has been walking and using soup cans to strengthen her arms.      Pain Ratin  Pain rating at best: 0  Pain rating at worst: 2  Pain description: aching and pain   Pt mentions some tightness and soreness across the UT muscles from overuse.      Functional limitations are described as occurring with:   lifting  turning head R>L for driving   performing routine daily activities    Patient reports benefit from:  soft brace, tylenol          Objective:      Note: Items left blank indicates the item was not performed or not indicated at the time of the evaluation.    Patient Outcome Measures :    Neck Disability Score in %: 18   Scores range from 0-100%, where a score of 0% represents minimal pain and maximal function. The minmal clinically important difference is a score reduction of 10%.    Cervical Thoracic Examination  1. Acute neck pain     2. S/P cervical spinal fusion     3. Neck muscle weakness     4.  Decreased ROM of neck     5. Abnormal posture       Involved side: Bilateral  Posture Observation:      General sitting posture is  fair.    Cervical ROM:    Date: 4/10/17     *Indicate scale AROM AROM AROM   Cervical Flexion Min-mod loss, min pulling/sore      Cervical Extension Mod loss, sore and guarded       Right Left Right Left Right Left   Cervical Sidebending Mod loss (10 cm) Mod loss (11 cm)        Cervical Rotation Min loss (17 cm)  Min loss (18 cm)        Cervical Protraction No loss, no pain      Cervical Retraction Min loss, no pain      Thoracic Flexion No loss, no pain      Thoracic Extension Min+ loss, stiffness       Strength    Date: 4/10/17     Cervical Myotomes/5 Right Left Right Left Right Left   Cervical Flex/Ext (C1-2) 4 4       Cervical Sidebending (C3) 4 4       Shoulder Elevation (C4) 4 4       Shoulder Abduction (C5) 4 4       Elbow Flexion (C6) 5 5       Elbow Extension (C7) 5 5       Wrist Flexion (C7) 5 5       Wrist Extension (C6) 5 5       Thumb abduction (C8)         Finger Abduction (T1)           Sensation: WNL        Reflex Testing: WNL      Palpation:  Increased tightness and tenderness over bilateral cervical paraspinals, scalenes and UT - R>L     Passive Mobility-Joint Integrity: Hypomobile.   Pain over cervical vertebrae     Cervical Special Tests : NT due to recent surgery       Treatment Today     TREATMENT MINUTES COMMENTS   Evaluation 25 low   Self-care/ Home management     Manual therapy     Neuromuscular Re-education     Therapeutic Activity     Therapeutic Exercises 30 Pathology, POC, HEP, etc   See flow sheet    Gait training     Modality__________________                Total 55    Blank areas are intentional and mean the treatment did not include these items.       PT Evaluation Code: (Please list factors)  Patient History/Comorbidities: osteoporosis   Examination: decreased ROM, strength, increased tightness and pain  Clinical Presentation: stable and uncomplicated    Clinical Decision Making: low     Patient History/  Comorbidities Examination  (body structures and functions, activity limitations, and/or participation restrictions) Clinical Presentation Clinical Decision Making (Complexity)   No documented Comorbidities or personal factors 1-2 Elements Stable and/or uncomplicated Low   1-2 documented comorbidities or personal factor 3 Elements Evolving clinical presentation with changing characteristics Moderate   3-4 documented comorbidities or personal factors 4 or more Unstable and unpredictable High            Chasidy Torres  4/10/2017  7:37 AM

## 2021-06-09 NOTE — PROGRESS NOTES
History:      Ann Padilla is a pleasant 72 yo female who is 6 weeks post acdf C5-7. She is doing very well. Her pain is minor.  She is wearing her Aspen collar for comfort  Exam:  Alter and oriented x 3.  vss afebrile    Incision: clean/dry/intact without evidence of infection    Neuro:     Motors  5/5 throughout w/o deficit    Sensory  Equal normal bilaterally    Imaginv cervical xrays were reviewed with the patient:  No solid fusion as of yet plate and screw construct is intact and stable.  The appearance of a facet fusion at C2-3,3-4,4-5    A/P  6 weeks post ACDF C5-7.  Cervicalgia  - Medical condition for Ms Padilla is to add physical therapy to assist her with residual neck pain.  We discussed that was additional time her neck and should resolve she will continue to wean down on her Norco.  I will see her back in 6 weeks  Should the patient have any future problems/questions/concerns they will let me know.

## 2021-06-09 NOTE — PROGRESS NOTES
"    History:    Ann Padilla is a pleasant 70 yo female who is 2 weeks post acdf C5-7.  She is doing very well.  Her pain is minor.  She did have episodes of oversedation and there is some concern present by an urgent care provider and she was sent to the emergency department.  She has decrease her narcotic intake and discontinued her benzodiazepine and her sedation is improved although still present.  She is describing some soreness over her incision and she feels as though her muscles are \"tired\".  No upper extremity pain.    Exam:  Alter and oriented x 3.  vss afebrile    Incision: clean/dry/intact without evidence of infection    Neuro:     Motors  5/5 throughout w/o deficit    Sensory  Equal normal bilaterally    Imaginv cervical xrays: Hardware is in perfect placement interbody grafts are in good placement appearance of developing fusion is present        A/P  2 weeks post acdf. Cervicalgia  -Discussed with Ms Padilla  and her  that I would like them only to take her pain medications as needed.  They've been taking them scheduled which I believe is contributing towards her confusion and sedation.  We'll also discontinue her oxycodone and transition her to Reform.  I will see her back in 4 weeks for her next visit.  Overall she's doing quite well.  Should the patient have any future problems/questions/concerns they will let me know.            "

## 2021-06-10 NOTE — PROGRESS NOTES
[facility] Discharge Summary      Patient Name: Ann Padilla  Date: 5/30/2017  Referring provider: Kam Jimenez PA-C  Visit Diagnosis:   1. Acute neck pain     2. S/P cervical spinal fusion     3. Neck muscle weakness     4. Decreased ROM of neck     5. Abnormal posture         Goals:  Pt. will demonstrate/verbalize independence in self-management of condition in : 6 weeks;Progressing toward  Pt. will improve posture : and demonstrate posture with minimal to no cuing;in 6 weeks;Progressing toward  Patient Turn Head: for driving;with partial ROM;with less pain;with less difficulty;in 6 weeks;Progressing toward  Patient will decrease : NDI score;for improved quality of life;in 6 weeks;Progressing toward  Pt will: perform housework and ADL's with increased ease and strength and decreased pain; in 6 weeks ; Progressing toward     Patient was seen for 6 visits with the last visit on 4/27/17.  Pt then saw the spine clinic and did not return for any remaining follow-ups.    The patient met goals and has demonstrated understanding of and independence in the home program for self-care, and progression to next steps.  She will initiate contact if questions or concerns arise.    Therapy will be discontinued at this time.  The patient will need a new referral to resume.    Thank you for your referral.  Chasidy Torres  5/30/2017  8:58 AM

## 2021-06-10 NOTE — PROGRESS NOTES
[facility] Daily Progress     Patient Name: Ann Padilla  Date: 2017  Visit #: 2  Referring provider: Kam Jimenez PA-C  Visit Diagnosis:     ICD-10-CM    1. Acute neck pain M54.2    2. S/P cervical spinal fusion Z98.1    3. Neck muscle weakness M53.82    4. Decreased ROM of neck R29.898    5. Abnormal posture R29.3          Assessment:     HEP/POC compliance is  good .  Patient demonstrates understanding/independence with home program.  Patient is benefitting from skilled physical therapy and is making steady progress toward functional goals.    Goal Status:  Pt. will demonstrate/verbalize independence in self-management of condition in : 6 weeks  Pt. will improve posture : and demonstrate posture with minimal to no cuing;in 6 weeks  Patient Turn Head: for driving;with partial ROM;with less pain;with less difficulty;in 6 weeks  Patient will decrease : NDI score;for improved quality of life;in 6 weeks  Pt will: perform housework and ADL's with increased ease and strength and decreased pain; in 6 weeks     Plan / Patient Education:     Continue with initial plan of care.  Progress with home program as tolerated.     Add MT when pt is able to tolerate it    Subjective:     Pain Ratin    Pt reports that things are going well.  Pt reports that her neck is a little sore and achy but nothing major.  Pt reports that she has felt like does not need her soft collar as much.      Pt reports that she went to the MD the other day for her leg swelling and rash.  She reports that her blood work came back normal and they told her not to worry.      Pt reports that she slept great last night.    Pt reports that she has been consistent with her HEP exercises and they are going well.      Objective:     Pt tolerates her exercises well with moderate cueing for technique.      Pt still with moderate bilateral ankle and feet swelling.  PT educated pt on going back to wearing her compression stockings during  "the day as well as putting her feet up and avoiding the dependent foot position.      Pt with decreased cervical AROM in all directions with tightness and minimal soreness.     Pt with decreased cervical and postural strength with decreased awareness.      Current Exercises:  Exercise #1: UBE  Comment #1: next time   Exercise #2: Cervical AROM - flex/ext, B SB, B Rot   Comment #2: x7 each   Exercise #3: Cervical A-Z  Comment #3: x1   Exercise #4: Cervical Isometrics - flex, B SB, supine ext   Comment #4: x6 with 5\" each   Exercise #5: Scap retract   Comment #5: x5 with 5\" each sitting and x5 in standing       Treatment Today     TREATMENT MINUTES COMMENTS   Evaluation     Self-care/ Home management     Manual therapy     Neuromuscular Re-education     Therapeutic Activity     Therapeutic Exercises 28 See flow sheet   Pt educated to wear compression stockings and put feet up for leg swelling.    Continue walking 30 min per day   Gait training     Modality__________________                Total 28    Blank areas are intentional and mean the treatment did not include these items.       Chasidy Torres, PT   4/13/2017      "

## 2021-06-10 NOTE — PROGRESS NOTES
PROGRESS NOTE   4/11/2017    SUBJECTIVE:  Ann Padilla is a 71 y.o. female  who presents for   Chief Complaint   Patient presents with     Vitamin D Deficiency     Recheck ? refill?     Rash     Recheck rash, itches on bilateral lower legs and feet swelling since 1/17     Patient comes in today with concerns over her feet that are swollen and have been swollen for several months.  She notes that she began taking hydrocodone for neck pain in December.  Ever since she began taking hydrocodone she has had swelling feet as well as a rash.  She did hydrocodone in December and then she switched to oxycodone immediately after surgery in February and then she went back on the hydrocodone for quite some time now has been off of this for the last 2 weeks.  She now is on extra strength Tylenol for pain in her neck after a cervical fusion that was performed in February 2017.  She is quite concerned that the swelling in her legs is due to an allergic reaction to these medications.  She never had swelling in her ankles prior to December when she started taking pain medication for her neck.  She otherwise is not having any side effects or any other symptoms to go along with the swelling in her lower legs.  She denies that she is having chest pain or shortness of breath or other difficulties.  She otherwise is a fairly healthy female.  She is on vitamin D as well as supplemental calcium per the surgeon who did her cervical fusion.  She notes that she had history of vitamin D deficiency and had been on replacement vitamin D doses in the past but her last vitamin D that we did last fall was normal.  The surgeon put her on vitamin D after her surgery and she is wondering if she needs to continue on that or if she can stop that.  She is also on the supplemental calcium and is wondering about whether or not she could needs to continue on that.  She does have a stimulator at home that is helping her bones fuse in the cervical area.   She notes that the leg swelling that she has less in the morning when she first gets up and by the time she goes to bed at night is seems fairly significant.  Her knees have gotten red as well and her legs get red as well.  She did have a significant rash but that seems to have improved now.    Patient Active Problem List   Diagnosis     Major Depression, Recurrent     Obesity     Osteoporosis     Mixed Hyperlipoproteinemia     Vitamin D Deficiency     Cervical radiculopathy       Current Outpatient Prescriptions   Medication Sig Dispense Refill     calcium citrate (CALCITRATE) 200 mg (950 mg) tablet Take 1 tablet (200 mg total) by mouth 2 (two) times a day. 200 tablet 4     ergocalciferol (VITAMIN D2) 50,000 unit capsule Take 1 capsule (50,000 Units total) by mouth once a week. 12 capsule 4     FLUoxetine (PROZAC) 20 MG capsule TAKE 3 CAPSULES BY MOUTH EVERY MORNING 270 capsule 1     simvastatin (ZOCOR) 40 MG tablet TAKE ONE TABLET BY MOUTH ONCE DAILY AT BEDTIME 90 tablet 1     cholecalciferol, vitamin D3, 1,000 unit tablet Take 4,000 Units by mouth daily.       HYDROcodone-acetaminophen 5-325 mg per tablet Take 1 tablet by mouth 2 (two) times a day.       No current facility-administered medications for this visit.        No Known Allergies    Past Medical History:   Diagnosis Date     Benign adenomatous polyp of large intestine 8/7/2008    removed at colonoscopy     Closed patellar sleeve fracture of right knee 11/21/2013    resolved     Osteopenia 10/2016    repeat dexa in 2 yrs       Past Surgical History:   Procedure Laterality Date     CERVICAL FUSION N/A 2/9/2017    Procedure: ANTERIOR CERVICAL DECOMPRESSION FUSION C5-7, WITH IMPULSE MONITORING.;  Surgeon: Bradley Magana MD;  Location: [facility] OR;  Service:      HEMILAMINOTOMY LUMBAR SPINE  1983       History   Smoking Status     Never Smoker   Smokeless Tobacco     Not on file       OBJECTIVE:     /80 (Patient Site: Right Arm, Patient  "Position: Sitting, Cuff Size: Adult Large)  Pulse 74 Comment: regular  Temp 97.9  F (36.6  C) (Oral)   Resp 14 Comment: regular  Ht 5' 6\" (1.676 m)  Wt 206 lb 6.4 oz (93.6 kg)  BMI 33.31 kg/m2    Physical Exam:  GENERAL APPEARANCE: A&A, NAD, well hydrated, well nourished  SKIN:  Normal skin turgor, no lesions/rashes   CV: RRR, no M/G/R   LUNGS: CTAB  EXTREMITY: Bilateral lower leg edema is noted which is symmetrical.  She does not have any type of rash on her feet.  She has minimal foot swelling it is more around her ankle itself.  There is no redness warmth to the touch or other evidence for infection.  She does not have any other abnormalities on her legs other than the fact that she has a couple of sores which appear to be healing fine on the anterior shin area.  Skin in the lower extremity region is thickened and tough suggestive of chronic edema.  NEURO: no gross deficits   PSYCHIATRIC;  Mood appropriate, memory intact    LABS:     Recent Results (from the past 240 hour(s))   Comprehensive Metabolic Panel   Result Value Ref Range    Sodium 142 136 - 145 mmol/L    Potassium 4.6 3.5 - 5.0 mmol/L    Chloride 105 98 - 107 mmol/L    CO2 25 22 - 31 mmol/L    Anion Gap, Calculation 12 5 - 18 mmol/L    Glucose 91 70 - 125 mg/dL    BUN 17 8 - 22 mg/dL    Creatinine 0.79 0.60 - 1.10 mg/dL    GFR MDRD Af Amer >60 >60 mL/min/1.73m2    GFR MDRD Non Af Amer >60 >60 mL/min/1.73m2    Bilirubin, Total 0.6 0.0 - 1.0 mg/dL    Calcium 9.7 8.5 - 10.5 mg/dL    Protein, Total 6.8 6.0 - 8.0 g/dL    Albumin 4.0 3.5 - 5.0 g/dL    Alkaline Phosphatase 87 45 - 120 U/L    AST 22 0 - 40 U/L    ALT 19 0 - 45 U/L   BNP(B-type Natriuretic Peptide)   Result Value Ref Range    BNP 19 0 - 117 pg/mL       ASSESSMENT/PLAN:     Leg swelling [M79.89]      1. Leg swelling  - Comprehensive Metabolic Panel  - BNP(B-type Natriuretic Peptide)    2. Vitamin D deficiency    Patient overall seems to be doing okay.  She certainly is not showing any " signs of other abnormalities other than the bilateral leg edema.  We talked about the fact that this is most likely dependent edema and certainly could be due to the fact that she is not as active because of the surgery that she is gone through in the last couple of months.  She has had a very rough course of recovery from this surgery and has been rehospitalized a couple of times and therefore that certainly can be contributing to swelling that she is having.  I doubt very much that this is related to the hydrocodone or the oxycodone that she was on.  I have explained that to her at great length today.  I suspect that this is dependent edema of her extremities.  We will go ahead and draw a conference of metabolic panel today as well as a BNP to further evaluate if there are other causes of her leg swelling.  In terms of her vitamin D deficiency and of her calcium I think is important that she continue on these supplements until after she sees the surgeon.  The surgeon was going to put her on the so I would want her to continue on them until she meets with him.  She believes that she has an appointment with him in early May and so she will continue on these medications until then.  She is wondering about changing from Effexor strength Tylenol to Advil and again I said that she should talk with her surgeon regarding that because it would not want her to switch to something that he did not feel was safe for her to take this far out from her cervical fusion.  She will meet with the surgeon in May and then decision on supplements can be done at that time.  Will contact her with results of the lab work and they return.  We talked about limiting salt and trying to get up and move around as much as she possibly can.  She should also elevate her legs when she sits.  I did offer her a prescription for some Lasix that she could use as needed but she declined that today.  I also suggested that she try some support hose to see  if that will help with the edema.  She notes that she has some at home but we talked about maybe going to the store getting some that do not have quite as much compression as they will be easier for her to get on and off and hopefully she will be more likely to use them.  She should let me know how things are going and whether this improves or not and I certainly will let her know when the labs come back.  All of her questions were answered today.   Gina Hays MD

## 2021-06-10 NOTE — PROGRESS NOTES
History:      Ann Padilla is a pleasant 70 yo female who is 12 weeks post acdf C5-7. She is doing very well. Her pain is minor.  She is much better than she was at our last visit      Exam:  Alter and oriented x 3. vss afebrile     Incision: clean/dry/intact without evidence of infection     Neuro:      Motors  5/5 throughout w/o deficit     Sensory  Equal normal bilaterally     Imaginv cervical xrays were reviewed with the patient: No solid fusion as of yet plate and screw construct is intact and stable. The appearance of a facet fusion at C2-3,3-4,4-5, interbody fusion C3-4.  No interval changes noted     A/P  12 weeks post ACDF C5-7. Cervicalgia  -Ann is doing quite well I will see her back in 3 more months.  She will continue to increase her activities as she can tolerate.  She will also continue with her bone neurostimulator P  Should the patient have any future problems/questions/concerns they will let me know.

## 2021-06-10 NOTE — PROGRESS NOTES
[facility] Daily Progress     Patient Name: Ann Padilla  Date: 2017  Visit #: 4  Referring provider: Kam Jimenez PA-C  Visit Diagnosis:     ICD-10-CM    1. Acute neck pain M54.2    2. S/P cervical spinal fusion Z98.1    3. Neck muscle weakness M53.82    4. Decreased ROM of neck R29.898    5. Abnormal posture R29.3          Assessment:     HEP/POC compliance is  good .  Patient demonstrates understanding/independence with home program.  Patient is benefitting from skilled physical therapy and is making steady progress toward functional goals.    Goal Status:  Pt. will demonstrate/verbalize independence in self-management of condition in : 6 weeks  Pt. will improve posture : and demonstrate posture with minimal to no cuing;in 6 weeks  Patient Turn Head: for driving;with partial ROM;with less pain;with less difficulty;in 6 weeks  Patient will decrease : NDI score;for improved quality of life;in 6 weeks  Pt will: perform housework and ADL's with increased ease and strength and decreased pain; in 6 weeks     Plan / Patient Education:     Continue with initial plan of care.  Progress with home program as tolerated.     Continue with there-ex and MT.        Pt see's provider on 17 - PN next time    Subjective:     Pain Ratin    Pt reports that things are going well.  Pt reports that her neck was a little sore after MT, but not too sore.  Pt reports that she is feeling sore mostly on the right side.       Pt reports that she took benadryl and that helped her legs with less itching.  However, pt reports that she has not worn her compression stockings much because they make her itch.      Pt reports that she has been consistent with her HEP exercises and they are going well.      Objective:     Pt tolerates her exercises well with moderate cueing for technique.      Pt still with moderate bilateral ankle and feet swelling.  PT educated pt on going back to wearing her compression stockings  "during the day as well as putting her feet up and avoiding the dependent foot position.      Pt with decreased cervical AROM in all directions with tightness and minimal soreness.     Pt with decreased cervical and postural strength with decreased awareness.  Pt with decreased UE strength.  This is all being addressed by her HEP.     Pt with moderate right and minimal left cervical paraspinal and UT muscle tightness and tenderness.  Pt tolerated MT fairly well and demonstrated increased cervical ROM after MT.      Current Exercises:  Exercise #1: UBE  Comment #1: x3 min   Exercise #2: Cervical AROM - flex/ext, B SB, B Rot   Comment #2: x5 each   Exercise #3: Cervical A-Z  Comment #3: x1  Exercise #4: Cervical Isometrics - flex, B SB, supine ext   Comment #4: supine ext x10 with 5\" hold, flex and SB x10 with 5\" each   Exercise #5: Scap retract   Comment #5: Shoulder band extension - orange Bx10 - added to HEP       Treatment Today     TREATMENT MINUTES COMMENTS   Evaluation     Self-care/ Home management     Manual therapy 10 STM (gentle) to bilateral cervical paraspinals and UT  Pt supine    Neuromuscular Re-education     Therapeutic Activity     Therapeutic Exercises 17 See flow sheet   Pt educated to wear compression stockings and put feet up for leg swelling.    Continue walking 30 min per day   Gait training     Modality__________________                Total 27    Blank areas are intentional and mean the treatment did not include these items.       Chasidy Torres, PT   4/20/2017  "

## 2021-06-10 NOTE — PROGRESS NOTES
[facility] Daily Progress     Patient Name: Ann Padilla  Date: 2017  Visit #: 3  Referring provider: Kam Jimenez PA-C  Visit Diagnosis:     ICD-10-CM    1. Acute neck pain M54.2    2. S/P cervical spinal fusion Z98.1    3. Neck muscle weakness M53.82    4. Decreased ROM of neck R29.898    5. Abnormal posture R29.3          Assessment:     HEP/POC compliance is  good .  Patient demonstrates understanding/independence with home program.  Patient is benefitting from skilled physical therapy and is making steady progress toward functional goals.    Goal Status:  Pt. will demonstrate/verbalize independence in self-management of condition in : 6 weeks  Pt. will improve posture : and demonstrate posture with minimal to no cuing;in 6 weeks  Patient Turn Head: for driving;with partial ROM;with less pain;with less difficulty;in 6 weeks  Patient will decrease : NDI score;for improved quality of life;in 6 weeks  Pt will: perform housework and ADL's with increased ease and strength and decreased pain; in 6 weeks     Plan / Patient Education:     Continue with initial plan of care.  Progress with home program as tolerated.     Check the results of MT.      Subjective:     Pain Ratin    Pt reports that things are going well.  Pt reports that her neck is a little sore and achy but much better overall.  Pt reports that she is no longer having to take anything for pain.  Pt reports that she is doing a little more lifting (not too heavy) and just moving around more in general.      Pt reports that she has been up at night with her legs itching and cannot even use a blanket on her as it bothers her legs.     Pt reports that she has been consistent with her HEP exercises and they are going well.      Objective:     Pt tolerates her exercises well with moderate cueing for technique.      Pt still with moderate bilateral ankle and feet swelling.  PT educated pt on going back to wearing her compression  "stockings during the day as well as putting her feet up and avoiding the dependent foot position.      Pt with decreased cervical AROM in all directions with tightness and minimal soreness.     Pt with decreased cervical and postural strength with decreased awareness.      Pt with moderate right and minimal left cervical paraspinal and UT muscle tightness and tenderness.  Pt tolerated MT fairly well and demonstrated increased cervical ROM after MT.      Current Exercises:  Exercise #1: UBE  Comment #1: x3 min   Exercise #2: Cervical AROM - flex/ext, B SB, B Rot   Comment #2: x5 each   Exercise #3: Cervical A-Z  Comment #3: x2  Exercise #4: Cervical Isometrics - flex, B SB, supine ext   Comment #4: supine ext x8 with 5\" hold   Exercise #5: Scap retract   Comment #5: x5 with 5\"       Treatment Today     TREATMENT MINUTES COMMENTS   Evaluation     Self-care/ Home management     Manual therapy 8 STM (gentle) to bilateral cervical paraspinals and UT  Pt supine    Neuromuscular Re-education     Therapeutic Activity     Therapeutic Exercises 20 See flow sheet   Pt educated to wear compression stockings and put feet up for leg swelling.    Continue walking 30 min per day   Gait training     Modality__________________                Total 28    Blank areas are intentional and mean the treatment did not include these items.       Chasidy Torres, PT   4/17/2017    "

## 2021-06-10 NOTE — PROGRESS NOTES
[facility] Daily Progress     Patient Name: Ann Padilla  Date: 2017  Visit #: 5  Referring provider: Kam Jimenez PA-C  Visit Diagnosis:     ICD-10-CM    1. Acute neck pain M54.2    2. S/P cervical spinal fusion Z98.1    3. Neck muscle weakness M53.82    4. Decreased ROM of neck R29.898    5. Abnormal posture R29.3          Assessment:     HEP/POC compliance is  good .  Patient demonstrates understanding/independence with home program.  Patient is benefitting from skilled physical therapy and is making steady progress toward functional goals.    Goal Status:  Pt. will demonstrate/verbalize independence in self-management of condition in : 6 weeks;Progressing toward  Pt. will improve posture : and demonstrate posture with minimal to no cuing;in 6 weeks;Progressing toward  Patient Turn Head: for driving;with partial ROM;with less pain;with less difficulty;in 6 weeks;Progressing toward  Patient will decrease : NDI score;for improved quality of life;in 6 weeks;Progressing toward  Pt will: perform housework and ADL's with increased ease and strength and decreased pain; in 6 weeks ; Progressing toward     Plan / Patient Education:     Continue with initial plan of care.  Progress with home program as tolerated.     Continue with there-ex and MT.        Pt see's provider on 17 - PN next time    Subjective:     Pain Ratin- 1    Pt reports that she is feeling much better overall.  Pt reports that she has some days where she has no pain and feels great and some days where she feels a little more stiffness, but not pain.   Pt reports that she still does not like to turn her head for backing up/driving.      Pt reports that she took benadryl and that helped her legs with less itching.  However, pt reports that she has not worn her compression stockings much because they make her itch.      Pt reports that she has been consistent with her HEP exercises and they are going well.      Objective:  "    Pt tolerates her exercises well with moderate cueing for technique.      Pt with decreased overall LE swelling but still moderate bilateral ankle swelling.  PT educated pt on going back to wearing her compression stockings during the day as well as putting her feet up and avoiding the dependent foot position.      Pt with decreased cervical AROM in all directions with tightness and minimal soreness.     Pt with decreased cervical and postural strength with decreased awareness.  Pt with decreased UE strength.  This is all being addressed by her HEP.     Pt with moderate right and minimal left cervical paraspinal and UT muscle tightness and tenderness.  Pt tolerated MT fairly well and demonstrated increased cervical ROM after MT.      Current Exercises:  Exercise #1: UBE  Comment #1: x3 min   Exercise #2: Cervical AROM - flex/ext, B SB, B Rot   Comment #2: x5 each   Exercise #3: Cervical A-Z  Comment #3: x1  Exercise #4: Cervical Isometrics - flex, B SB, supine ext   Comment #4: supine ext x10 with 5\" hold, flex and SB x10 with 5\" each   Exercise #5: Scap retract   Comment #5: Shoulder band extension - orange Bx10 - added to HEP       Treatment Today     TREATMENT MINUTES COMMENTS   Evaluation     Self-care/ Home management     Manual therapy 10 STM (gentle) to bilateral cervical paraspinals and UT  Pt supine    Neuromuscular Re-education     Therapeutic Activity     Therapeutic Exercises 17 See flow sheet   Pt educated to wear compression stockings and put feet up for leg swelling.    Continue walking 30 min per day   Gait training     Modality__________________                Total 27    Blank areas are intentional and mean the treatment did not include these items.       Chasidy Torres, PT   4/24/2017  "

## 2021-06-10 NOTE — PROGRESS NOTES
[facility] Daily Progress     Patient Name: Ann Padilla  Date: 2017  Visit #: 6  Referring provider: Kam Jimenez PA-C  Visit Diagnosis:     ICD-10-CM    1. Acute neck pain M54.2    2. S/P cervical spinal fusion Z98.1    3. Neck muscle weakness M53.82    4. Decreased ROM of neck R29.898    5. Abnormal posture R29.3          Assessment:     HEP/POC compliance is  good .  Patient demonstrates understanding/independence with home program.  Patient is benefitting from skilled physical therapy and is making steady progress toward functional goals.    Goal Status:  Pt. will demonstrate/verbalize independence in self-management of condition in : 6 weeks;Progressing toward  Pt. will improve posture : and demonstrate posture with minimal to no cuing;in 6 weeks;Progressing toward  Patient Turn Head: for driving;with partial ROM;with less pain;with less difficulty;in 6 weeks;Progressing toward  Patient will decrease : NDI score;for improved quality of life;in 6 weeks;Progressing toward  Pt will: perform housework and ADL's with increased ease and strength and decreased pain; in 6 weeks ; Progressing toward     Plan / Patient Education:     Continue with initial plan of care.  Progress with home program as tolerated.     Pt see's the spine provider - determine a POC.     Subjective:     Pain Ratin- 1    Pt reports that she is feeling much better overall.  Pt reports that she has some days where she has no pain and feels great and some days where she feels a little more stiffness, but not pain.   Pt reports that she still does not like to turn her head for backing up/driving.      Pt reports that today she is feeling a little more achy.      Pt reports that she has been consistent with her HEP exercises and they are going well.      Pt reports that she is feeling 80% better so far.     Objective:     Pt tolerates her exercises well with moderate cueing for technique.  Pt with a significant increase  "in function, ROM, strength and decreased pain.      Cervical ROM:   Date: 4/10/17  4/27/17      *Indicate scale AROM AROM AROM   Cervical Flexion Min-mod loss, min pulling/sore  None-Min loss, stiffness      Cervical Extension Mod loss, sore and guarded  Min loss, stiffness        Right Left Right Left Right Left   Cervical Sidebending Mod loss (10 cm) Mod loss (11 cm)   Mod loss (10 cm) Mod loss (11 cm)       Cervical Rotation Min loss (17 cm)  Min loss (18 cm)  Min loss (15 cm)  Min loss (15 cm)        Cervical Protraction No loss, no pain  No loss, no pain       Cervical Retraction Min loss, no pain   No loss, no pain      Thoracic Flexion No loss, no pain   No loss, no pain      Thoracic Extension Min+ loss, stiffness Min loss, stiffness          Strength   Date: 4/10/17  4/27/17     Cervical Myotomes/5 Right Left Right Left Right Left   Cervical Flex/Ext (C1-2) 4 4  4+  4+       Cervical Sidebending (C3) 4 4  4+  4+       Shoulder Elevation (C4) 4 4  4+ 4+       Shoulder Abduction (C5) 4 4  4+ 4+       Elbow Flexion (C6) 5 5  5  5       Elbow Extension (C7) 5 5  5  5       Wrist Flexion (C7) 5 5  5  5       Wrist Extension (C6) 5 5  5  5              Palpation:  Pt with moderate right and minimal left cervical paraspinal and UT muscle tightness and tenderness.  Pt tolerated MT fairly well and demonstrated increased cervical ROM after MT.      LE's:   Pt with decreased overall LE swelling but still moderate bilateral ankle swelling.  PT educated pt on going back to wearing her compression stockings during the day as well as putting her feet up and avoiding the dependent foot position.           Current Exercises:  Exercise #1: UBE  Comment #1: x3 min   Exercise #2: Cervical AROM - flex/ext, B SB, B Rot   Comment #2: x5 each for measure   Exercise #3: Cervical A-Z  Comment #3: Shoulder Raises - scaption Bx20   Exercise #4: Cervical Isometrics - flex, B SB, supine ext   Comment #4: supine ext x12 with 5\" " "hold,  Exercise #5: Scap retract x10 with 5\"   Comment #5: Shoulder band extension - orange Bx15      Treatment Today     TREATMENT MINUTES COMMENTS   Evaluation     Self-care/ Home management     Manual therapy 10 STM (gentle) to bilateral cervical paraspinals and UT  Pt supine    Neuromuscular Re-education     Therapeutic Activity     Therapeutic Exercises 18 See flow sheet   Pt educated to wear compression stockings and put feet up for leg swelling.    Continue walking 30 min per day   Gait training     Modality__________________                Total 28    Blank areas are intentional and mean the treatment did not include these items.       Chasidy Torres, PT   4/27/2017  "

## 2021-06-15 PROBLEM — M54.12 CERVICAL RADICULOPATHY: Status: ACTIVE | Noted: 2017-02-09

## 2021-06-18 NOTE — PROGRESS NOTES
Assessment and Plan:     Routine general medical examination at a health care facility [Z00.00]     1. Routine general medical examination at a health care facility    2. Vitamin D deficiency  - Vitamin D, Total (25-Hydroxy)    3. Mixed Hyperlipoproteinemia  - Lipid Cascade  - Comprehensive Metabolic Panel  - simvastatin (ZOCOR) 40 MG tablet; Take 1 tablet (40 mg total) by mouth at bedtime.  Dispense: 90 tablet; Refill: 1    4. Osteoporosis  - DXA Bone Density Scan; Future    5. Major depressive disorder, recurrent episode  - FLUoxetine (PROZAC) 20 MG capsule; TAKE 3 CAPSULES BY MOUTH EVERY MORNING  Dispense: 270 capsule; Refill: 1    6. Screen for colon cancer    7. Encounter for screening mammogram for malignant neoplasm of breast  - Mammo Screening Bilateral; Future    8. Postmenopausal  - DXA Bone Density Scan; Future    Patient is overall doing well.  She feels like things are doing well.  She is a bit overwhelmed at this point because they are moving in a couple of weeks.  This all came on very suddenly.  They put their townhouse on the market and it sold within 48 hours and so they scrambled to find a place down there to live and are moving within the next couple of weeks.  She is still trying to adjust all of that.  She is due for mammogram will go ahead and schedule that in the near future.  I did place an order today so that someone should call her to get that scheduled.  She is due for a DEXA scan in October 2018 we did discuss that.  She is due for colonoscopy in September 2018 and we discussed that as well.  I am going to refill her Prozac today.  She seems to be doing very well on that.  She needs to establish with a new doctor down there before any further refills will be given.  Vitamin D level was drawn today as she does have a history of vitamin D deficiency in the past.  We did go over advanced directive today.  She does have an advanced directive and will make sure that her new physician gets a  copy of that as well.  She has had increased cholesterol in the past will go ahead and check electrolytes as well as lipid panel today.  Overall seems to be doing well.  She is well socialized and is looking forward to the move to get out of the [place]una here in [place].  All of her questions were answered today.  We will see her back on an as-needed basis.    The patient's current medical problems were reviewed.    I have had an Advance Directives discussion with the patient.  The following health maintenance schedule was reviewed with the patient and provided in printed form in the after visit summary:   Health Maintenance   Topic Date Due     DEPRESSION FOLLOW UP  1945     FALL RISK ASSESSMENT  11/01/2017     COLONOSCOPY  05/23/2019 (Originally 9/3/2018)     DXA SCAN  09/29/2018     MAMMOGRAM  10/25/2018     ADVANCE DIRECTIVES DISCUSSED WITH PATIENT  11/01/2021     TD 18+ HE  06/20/2022     PNEUMOCOCCAL POLYSACCHARIDE VACCINE AGE 65 AND OVER  Completed     INFLUENZA VACCINE RULE BASED  Completed     PNEUMOCOCCAL CONJUGATE VACCINE FOR ADULTS (PCV13 OR PREVNAR)  Completed     ZOSTER VACCINE  Completed        Subjective:   Chief Complaint: Ann Padilla is an 73 y.o. female here for an Annual Wellness visit.     HPI: Patient is a 73-year-old here for physical exam.  She overall is doing well.  She really has no concerns today.  Her mammogram is due at this time.  Her last mammogram was in October 2016 and I will place an order for that.  She also is due for a DEXA scan in October 2018.  We discussed the fact that she is due for colonoscopy in September 2018.  She does have a history of high cholesterol will go ahead and check lipid panel today as well as electrolyte panel.  She continues on Prozac 60 mg a day.  Please see PHQ 9 and NAN which are both completed in their entirety today.  We will go ahead and refill that today so that she has enough until she can find a new doctor after she moves.  We did ask  her to do the clock drawing today and she did not know how to do 11:10.  She notes that she usually just reads a digital clock she has not looked at a regular clock and ages and she thinks that is probably why.  She also could only remember 2 out of 3 words.  She still passed in terms of saying that there was no dementia and certainly her actions today do not suggest that she has any hint of dementia but we need to monitor this carefully.  She does have history of vitamin D deficiency and will go ahead and check that today as well.  She has had spine surgery within the last couple of years and is concerned about whether or not this is going to be a recurrent problem for her.  We discussed the fact that the fusion that she had should not cause her difficulties in the future unless new problems develop.  She was quite reassured by that.      Patient Care Team:  Michele Almendarez MD as PCP - [facility] ([facility] Provider)     Patient Active Problem List   Diagnosis     Major Depression, Recurrent     Obesity     Osteoporosis     Mixed Hyperlipoproteinemia     Vitamin D Deficiency     Cervical radiculopathy     Past Medical History:   Diagnosis Date     Benign adenomatous polyp of large intestine 8/7/2008    removed at colonoscopy     Closed patellar sleeve fracture of right knee 11/21/2013    resolved     Osteopenia 10/2016    repeat dexa in 2 yrs      Past Surgical History:   Procedure Laterality Date     CERVICAL FUSION N/A 2/9/2017    Procedure: ANTERIOR CERVICAL DECOMPRESSION FUSION C5-7, WITH IMPULSE MONITORING.;  Surgeon: Bradley Magana MD;  Location: [facility];  Service:      HEMILAMINOTOMY LUMBAR SPINE  1983      Family History   Problem Relation Age of Onset     Coronary artery disease Father 60     had CABG     Alzheimer's disease Father      Hip fracture Mother      Cancer Maternal Grandfather 86     Stomach      Social History     Social History     Marital status:       "Spouse name: N/A     Number of children: N/A     Years of education: N/A     Occupational History     Not on file.     Social History Main Topics     Smoking status: Never Smoker     Smokeless tobacco: Never Used     Alcohol use 4.2 oz/week     7 Glasses of wine per week     Drug use: No     Sexual activity: Not on file     Other Topics Concern     Not on file     Social History Narrative    02/15/17 - Patient resides with her .       Current Outpatient Prescriptions   Medication Sig Dispense Refill     ascorbate calcium (VITAMIN C ORAL) Take by mouth daily.       calcium citrate (CALCITRATE) 200 mg (950 mg) tablet Take 1 tablet (200 mg total) by mouth 2 (two) times a day. 200 tablet 4     cholecalciferol, vitamin D3, 1,000 unit tablet Take 1,000 Units by mouth 2 (two) times a day.        FLUoxetine (PROZAC) 20 MG capsule TAKE 3 CAPSULES BY MOUTH EVERY MORNING 270 capsule 1     simvastatin (ZOCOR) 40 MG tablet Take 1 tablet (40 mg total) by mouth at bedtime. 90 tablet 0     No current facility-administered medications for this visit.       Objective:   Vital Signs:   Visit Vitals     /78 (Patient Site: Right Arm, Patient Position: Sitting, Cuff Size: Adult Large)     Pulse 76     Temp 97.8  F (36.6  C) (Oral)     Ht 5' 6.8\" (1.697 m)     Wt 204 lb 12.8 oz (92.9 kg)     SpO2 97%     Breastfeeding No     BMI 32.27 kg/m2        VisionScreening:  No exam data present     REVIEW OF SYSTEMS:  Denies fever, chills, visual changes, fatigue, myalgias, nasal congestion, rhinorrhea, ear pain or discharge, sore throat, swollen glands, breast mass, nipple discharge, breast changes, abdominal pain, nausea, vomiting, diarrhea, constipation, cough, shortness of breath, chest pain, weight change, change in bowel habits, melena, rectal bleeding, dysuria, frequency, urgency, hematuria, polyuria, polydipsia, polyphagia, joint pain or swelling or erythema, edema, rash, weakness, paresthesias, vaginal discharge or bleeding " or mood changes.  Remainder of review of systems was negative.      PHYSICAL EXAM:  On exam, patient is a WD, WN 73 year old female in NAD.  Head normocephalic, atraumatic.  Eyes PERRL, ears TM s clear bilaterally.  Throat without significant erythemia or exudate.  Neck was supple, full range of motion. No significant lymphadenopathy or thyromegaly was appreciated.  Lungs clear to auscultation  Heart regular rate and rhythm.  Breast exam was done. No masses were appreciated. Axilla were clear bilaterally. No nipple discharge was appreciated. Self breast exam was reviewed and taught today.  Abdomen was soft, nontender, nondistended. No masses or organomegaly were palpated. Positive bowel sounds were appreciated.  Extremities with full range of motion of all 4 extremities were noted.  Deep tendon reflexes were equal and symmetrical. Motor and sensation were intact to both the upper and lower extremities.  Cranial nerves 2 through 12 were grossly intact.  EOM were intact.  Pelvic exam was done.  External genitalia appeared normal. Bimanual exam revealed uterus to be normal size and adenexa without palpable masses.     Assessment Results 5/23/2018   Activities of Daily Living No help needed   Instrumental Activities of Daily Living No help needed   Mini Cog Total Score 3   Some recent data might be hidden     A Mini-Cog score of 0-2 suggests the possibility of dementia, score of 3-5 suggests no dementia    Identified Health Risks:     She is at risk for lack of exercise and has been provided with information to increase physical activity for the benefit of her well-being.  The patient was counseled and encouraged to consider modifying their diet and eating habits. She was provided with information on recommended healthy diet options.  We discussed the importance of at least 4 servings of fruits of vegetables a day.  Patient's advanced directive was discussed and I am comfortable with the patient's wishes.  Patient is  moving within the next 2 weeks to [place].  They are going to be living close to [facility].  I discussed with them that it is very important to get a copy of the advanced directive and get it to their new doctor so that they have a copy of this as well.  Her daughter is healthcare directive in addition to her .

## 2021-06-23 NOTE — TELEPHONE ENCOUNTER
Refill Approved    Rx renewed per Medication Renewal Policy. Medication was last renewed on 5/23/18.    Liliana Persaud, [facility] Triage/Med Refill 2/4/2019     Requested Prescriptions   Pending Prescriptions Disp Refills     simvastatin (ZOCOR) 40 MG tablet [Pharmacy Med Name: SIMVASTATIN 40MG TABLETS] 90 tablet 0     Sig: TAKE 1 TABLET(40 MG) BY MOUTH AT BEDTIME    Statins Refill Protocol (Hmg CoA Reductase Inhibitors) Passed - 2/1/2019 10:42 AM       Passed - PCP or prescribing provider visit in past 12 months     Last office visit with prescriber/PCP: 4/11/2017 Gina Hays MD OR same dept: Visit date not found OR same specialty: 4/11/2017 Gina Hays MD  Last physical: 5/23/2018 Last MTM visit: Visit date not found   Next visit within 3 mo: Visit date not found  Next physical within 3 mo: Visit date not found  Prescriber OR PCP: Gina Hays MD  Last diagnosis associated with med order: 1. Mixed Hyperlipoproteinemia  - simvastatin (ZOCOR) 40 MG tablet [Pharmacy Med Name: SIMVASTATIN 40MG TABLETS]; TAKE 1 TABLET(40 MG) BY MOUTH AT BEDTIME  Dispense: 90 tablet; Refill: 0    If protocol passes may refill for 12 months if within 3 months of last provider visit (or a total of 15 months).

## 2021-06-25 NOTE — PROGRESS NOTES
Progress Notes by Rosario Long PT at 1/12/2017  2:30 PM     Author: Rosario Long PT Service: -- Author Type: Physical Therapist    Filed: 1/12/2017  5:35 PM Encounter Date: 1/12/2017 Status: Signed    : Rosario Long PT (Physical Therapist)       [facility]   Cervical Thoracic Follow Up    Patient Name: Ann Padilla  Date of evaluation: 1/12/2017  Referral Diagnosis: Cervical Radiculitis  Referring provider: Trisha Pollock PA-C  Visit: 2/10  Visit Diagnosis:     ICD-10-CM    1. Cervical radiculitis M54.12    2. Abnormal posture R29.3        Assessment:    Pt's L UE pain and tingling along lateral aspect of UE from top of shoulder to hand in all fingers decreased this day with manual techniques and stretching; where pt left without numbness or tingling from elbow down. Pt demonstrates understanding of use of rolled towel along spine in supine for pec stretch and relief at home.  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 Pt would benefit from skilled physical therapy in order to address posture and muscle balance to decrease nerve pressure in LUE.     Pt. is appropriate for skilled PT intervention as outlined in the Plan of Care (POC).    Goals:  Pt. will demonstrate/verbalize independence in self-management of condition in : 6 weeks  Pt. will have improved quality of sleep: with less pain;getting 75-90% of required amount;in 6 weeks  Patient will decrease : NDI score;by _ points;for improved quality of function;in 6 weeks  by ___ points: 10   Pt will: demonstrate driving with <2/10 pain in L UE in 6 weeks.     Patient's expectations/goals are realistic.    Barriers to Learning or Achieving Goals:  No Barriers.       Plan / Patient Instructions:      Plan for next visit: low rows, shoulder extensions, ball under chin     Subjective:        Feet started swelling- only happened sometimes when she flies. More sleep last night. Went completely off medications for  awhile.   L UE is bad 9+      Functional limitations are described as occurring with:    - changing sleeping positions   - Driving.        Objective:      Treatment Today     TREATMENT MINUTES COMMENTS   Evaluation     Self-care/ Home management     Manual therapy 27 Prone pec stretching to L   Supine pec MFR on L with pt laying over towel for added pec stretch   Teres major STM with L UE ABD with pt in prone   Neuromuscular Re-education     Therapeutic Activity     Therapeutic Exercises  Verbally reviewed HEP with pt throughout manual    Gait training     Modality__________________                Total 27    Blank areas are intentional and mean the treatment did not include these items.     HEP:                 Rosario Long  1/12/2017  2:34 PM

## 2021-06-25 NOTE — PROGRESS NOTES
Progress Notes by Rosario Long PT at 1/9/2017 10:30 AM     Author: Rosario Long PT Service: -- Author Type: Physical Therapist    Filed: 1/9/2017  1:54 PM Encounter Date: 1/9/2017 Status: Signed    : Rosario Long PT (Physical Therapist)       [facility]   Cervical Thoracic Follow Up    Patient Name: Ann Padilla  Date of evaluation: 1/9/2017  Referral Diagnosis: Cervical Radiculitis  Referring provider: Michele Almendarez MD  Visit: 2/10  Visit Diagnosis:     ICD-10-CM    1. Cervical radiculitis M54.12    2. Abnormal posture R29.3        Assessment:    Pt's L UE pain and tingling along lateral aspect of UE from top of shoulder to hand in all fingers decreased this day with manual techniques and stretching. Pt demonstrates compression at anterior shoulder and teres major on L. 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  Pt would benefit from skilled physical therapy in order to address posture and muscle balance to decrease nerve pressure in LUE.     Pt. is appropriate for skilled PT intervention as outlined in the Plan of Care (POC).    Goals:  Pt. will demonstrate/verbalize independence in self-management of condition in : 6 weeks  Pt. will have improved quality of sleep: with less pain;getting 75-90% of required amount;in 6 weeks  Patient will decrease : NDI score;by _ points;for improved quality of function;in 6 weeks  by ___ points: 10   Pt will: demonstrate driving with <2/10 pain in L UE in 6 weeks.     Patient's expectations/goals are realistic.    Barriers to Learning or Achieving Goals:  No Barriers.       Plan / Patient Instructions:      Plan for next visit: low rows, shoulder extensions, ball under chin     Subjective:        L UE is bad 9+      Functional limitations are described as occurring with:    - changing sleeping positions   - Driving.        Objective:      Treatment Today     TREATMENT MINUTES COMMENTS   Evaluation     Self-care/ Home  management     Manual therapy 20 Prone pec stretching to L   Seated Upper trap MFR   Teres major STM with L UE ABD   Neuromuscular Re-education     Therapeutic Activity     Therapeutic Exercises 10 Prone Peter pans x 10 sec holds x 10 reps   Sit and reach at edge of table for Upper trap stretching with breathing     Gait training     Modality__________________                Total 30    Blank areas are intentional and mean the treatment did not include these items.     HEP:                 Rosario Long  1/9/2017  2:34 PM

## 2021-06-25 NOTE — PROGRESS NOTES
Progress Notes by Rosario Long PT at 1/16/2017 10:30 AM     Author: Rosario Long PT Service: -- Author Type: Physical Therapist    Filed: 1/16/2017 11:28 AM Encounter Date: 1/16/2017 Status: Signed    : Rosario Long PT (Physical Therapist)       [facility]   Cervical Thoracic Follow Up    Patient Name: Ann Padilla  Date of evaluation: 1/16/2017  Referral Diagnosis: Cervical Radiculitis  Referring provider: Trisha Pollock PA-C  Visit: 3/10  Visit Diagnosis:     ICD-10-CM    1. Cervical radiculitis M54.12    2. Abnormal posture R29.3        Assessment:    Pt's L UE pain and tingling along lateral aspect of UE that is worst when sitting in the car.  Myofascial restriction in subscap and levator scap on L side. Thoracic extension helps to decrease pt's pain temporarily. Trial of Kinesio tape this day along paraspinals with moderate stretch for neuro reminder of thoracic extension for posture. PT reviewed use of rolled towel along spine in supine for pec stretch and relief at home.  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 Pt would benefit from skilled physical therapy in order to address posture and muscle balance to decrease nerve pressure in LUE.     Pt. is appropriate for skilled PT intervention as outlined in the Plan of Care (POC).    Goals:  Pt. will demonstrate/verbalize independence in self-management of condition in : 6 weeks  Pt. will have improved quality of sleep: with less pain;getting 75-90% of required amount;in 6 weeks  Patient will decrease : NDI score;by _ points;for improved quality of function;in 6 weeks  by ___ points: 10   Pt will: demonstrate driving with <2/10 pain in L UE in 6 weeks.     Patient's expectations/goals are realistic.    Barriers to Learning or Achieving Goals:  No Barriers.       Plan / Patient Instructions:      Plan for next visit: low rows, shoulder extensions, ball under chin     Subjective:        2 really good days  last week Friday and Saturday. Arm is on fire today at lateral aspect.      Functional limitations are described as occurring with:    - changing sleeping positions   - Driving.        Objective:      Treatment Today     TREATMENT MINUTES COMMENTS   Evaluation     Self-care/ Home management     Manual therapy 15 Prone levator scap, supscap and rhomboid MFR   Discussed prone lying with towel under anterior shoulder for thoracic extension     Neuromuscular Re-education 5 K tape to thoracic paraspinals on L with moderate stretch   Therapeutic Activity     Therapeutic Exercises 10 Thoracic extension with B shoulder flexion in standing   Orange theraband low rows x 10 reps   Orange theraband shoulder extensions x 10 reps x 2 sets  Prone T's x 10 reps   Prone negative Y x 10 reps    Gait training     Modality__________________                Total 25    Blank areas are intentional and mean the treatment did not include these items.     HEP:                 Rosario Long  1/16/2017  2:34 PM

## 2021-06-25 NOTE — PROGRESS NOTES
Progress Notes by Rosario Long PT at 1/6/2017  2:30 PM     Author: Rosario Long PT Service: -- Author Type: Physical Therapist    Filed: 1/6/2017  4:10 PM Encounter Date: 1/6/2017 Status: Signed    : Rosario Long PT (Physical Therapist)       [facility]   Cervical Thoracic Initial Evaluation    Patient Name: Ann Padilla  Date of evaluation: 1/6/2017  Referral Diagnosis: Cervical Radiculitis  Referring provider: Trisha Pollock PA-C  Visit: 1/10  Visit Diagnosis:     ICD-10-CM    1. Cervical radiculitis M54.12    2. Abnormal posture R29.3        Assessment:    Pt presents with L UE pain and tingling along lateral aspect of UE from top of shoulder to hand in all fingers. 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 Pain and tingling keeps pt from sleeping and increases with driving. Pt would benefit from skilled physical therapy in order to address posture and muscle balance to decrease neurological pressure in LUE.     Pt. is appropriate for skilled PT intervention as outlined in the Plan of Care (POC).    Goals:  Pt. will demonstrate/verbalize independence in self-management of condition in : 6 weeks  Pt. will have improved quality of sleep: with less pain;getting 75-90% of required amount;in 6 weeks  Patient will decrease : NDI score;by _ points;for improved quality of function;in 6 weeks  by ___ points: 10   Pt will: demonstrate driving with <2/10 pain in L UE in 6 weeks.     Patient's expectations/goals are realistic.    Barriers to Learning or Achieving Goals:  No Barriers.       Plan / Patient Instructions:      Plan of Care:   Authorization / Certification Start Date: 01/06/17  Authorization / Certification End Date: 04/06/17  Authorization / Certification Number of Visits: 10  Communication with: Referral Source  Patient Related Instruction: Nature of Condition;Treatment plan and rationale;Body mechanics;Posture  Times per Week: 1-2  Number of Weeks:  5-10  Number of Visits: 10  Therapeutic Exercise: ROM;Stretching;Strengthening  Neuromuscular Reeducation: kinesio tape;posture;TNE;postural restoration;core;balance/proprioception  Manual Therapy: soft tissue mobilization;myofascial release;joint mobilization;muscle energy;strain counterstrain  Modalities: traction;electrical stimulation;TENS    Plan for next visit: low rows, shoulder extensions, ball under chin     Subjective:       History of Present Illness:    Ann is a 71 y.o. female who presents to therapy today with complaints of L UE pain.   - C  stenosis   Date of onset  started aching in L shoulder blade    - chiro did massage which made it worse   - tingling on L hand    - hydrocodeine, gabapentin, aleve but no change    Symptoms are constant and not improving.  She denies history of similar symptoms. She describes their previous level of function as not limited   - Had PT by [facility]   Pain Ratin  Pain rating at best: 0 with 1st thing in the AM  Pain rating at worst: 9 with lifting arm, errand  Pain description: pain and tingling    Functional limitations are described as occurring with:    - changing sleeping positions   - Driving.        Objective:      Note: Items left blank indicates the item was not performed or not indicated at the time of the evaluation.    Patient Outcome Measures :    Neck Disability Score in %: 62   Scores range from 0-100%, where a score of 0% represents minimal pain and maximal function. The minmal clinically important difference is a score reduction of 10%.    Cervical Thoracic Examination  1. Cervical radiculitis     2. Abnormal posture       Precautions/Restrictions: None    Involved side: Left    Posture Observation:      General sitting posture is  normal.  General standing posture is normal.    Cervical ROM: Within normal limits unless otherwise indicated    Date:      *Indicate scale AROM AROM AROM   Cervical Flexion 35     Cervical Extension 24      Right  Left Right Left Right Left   Cervical Sidebending         Cervical Rotation 60 30       Cervical Protraction Relieves pain     Cervical Retraction Increases pain     Thoracic Flexion      Thoracic Extension      Thoracic Sidebending         Thoracic Rotation           Strength: not measured this day. Pt was too sensitive.   Date:      Cervical Myotomes/5 Right Left Right Left Right Left   Cervical Flexion (C1-2)         Cervical Sidebending (C3)         Shoulder Elevation (C4)         Shoulder Abduction (C5)         Elbow Flexion (C6)         Elbow Extension (C7)         Wrist Flexion (C7)         Wrist Extension (C6)         Thumb abduction (C8)         Finger Abduction (T1)           Pt notes tingling along lateral aspect of LE UE into lateral forearm and all of her hand.     Flexibility: decreased pain/tingling with arm elevation    Palpation: Significant L sided lower cervical tone, Occipital tone L>R, Upper trap tone L>R    Passive Mobility-Joint Integrity: C6 slightly increased pain otherwise no change with pt in supine    Cervical Special Tests     Cervical Special Tests Right Left UE Nerve Mobility Right Left   Cervical compression   Median nerve     Cervical distraction + -  Ulnar nerve     Spurlings test - - Radial nerve     Shoulder abduction sign   Thoracic outlet     Deep neck flexor endurance test   Kamaljit     Upper cervical rotation   Adsons     Sharper-William   Cervical rotation lateral flexion     Alar ligament test   Other:     Other:   Other:           Treatment Today     TREATMENT MINUTES COMMENTS   Evaluation 20 Cervical spine   Self-care/ Home management     Manual therapy 15 Supine MFR to suboccipitals, cervical paraspinals  Supine manual traction   Neuromuscular Re-education     Therapeutic Activity     Therapeutic Exercises 20 See Below; attempted cervical retraction and side bending however pt's symptoms too irritated to continue with those exercises at this time.   Discussed proper sleeping  and driving postures with demonstration.    Gait training     Modality__________________                Total 55    Blank areas are intentional and mean the treatment did not include these items.     HEP:     PT Evaluation Code: (Please list factors)  Patient History/Comorbidities: obesity, osteoporosis, depression  Examination: cervical spine  Clinical Presentation: stable   Clinical Decision Making: low     Patient History/  Comorbidities Examination  (body structures and functions, activity limitations, and/or participation restrictions) Clinical Presentation Clinical Decision Making (Complexity)   No documented Comorbidities or personal factors 1-2 Elements Stable and/or uncomplicated Low   1-2 documented comorbidities or personal factor 3 Elements Evolving clinical presentation with changing characteristics Moderate   3-4 documented comorbidities or personal factors 4 or more Unstable and unpredictable High             Rosario Long  1/6/2017  2:34 PM

## 2021-06-25 NOTE — PROGRESS NOTES
Progress Notes by Rosario Long PT at 1/25/2017 11:00 AM     Author: Rosario Long PT Service: -- Author Type: Physical Therapist    Filed: 3/27/2017  2:43 PM Encounter Date: 1/25/2017 Status: Addendum    : Rosario Long PT (Physical Therapist)    Related Notes: Original Note by Rosario Long PT (Physical Therapist) filed at 1/25/2017 12:00 PM       [facility] Discharge Summary  Patient Name: Ann Padilla  Date: 3/27/2017  Referral Diagnosis: Cervical radiculitis  Referring provider: Michele Almendarez MD  Visit Diagnosis:   1. Cervical radiculitis     2. Abnormal posture         Goals:  Pt. will demonstrate/verbalize independence in self-management of condition in : 6 weeks Goal Met  Pt. will have improved quality of sleep: with less pain;getting 75-90% of required amount;in 6 weeks Goal Met  Patient will decrease : NDI score;by _ points;for improved quality of function;in 6 weeks  by ___ points: 10 Not rescored  Pt will: demonstrate driving with <2/10 pain in L UE in 6 weeks.     Patient was seen for 6 visits from 1/6/17 to 1/25/17 with 0 missed appointments.  Patient received a home program See below  The patient discontinued therapy, did not return.    Therapy will be discontinued at this time.  The patient will need a new referral to resume.    Thank you for your referral.  Rosario Long  3/27/2017  2:41 PM  [facility]   Cervical Thoracic Follow Up    Patient Name: Ann Padilla  Date of evaluation: 1/25/2017  Referral Diagnosis: Cervical Radiculitis  Referring provider: Michele Almendarez MD  Visit: 5/10  Visit Diagnosis:     ICD-10-CM    1. Cervical radiculitis M54.12    2. Abnormal posture R29.3        Assessment:    Pt's L UE pain and tingling along lateral aspect of UE that is worst when sitting in the car and driving.  Myofascial restrictions in forearm create pressure on pt's median nerve and numbness in index and middle finger. After manual  techniques to L forearm pt reports no tingling or numbness in L forearm or hand.  Pt would benefit from skilled physical therapy in order to address posture and muscle balance to decrease nerve pressure in LUE.     Pt. is appropriate for skilled PT intervention as outlined in the Plan of Care (POC).    Goals:  Pt. will demonstrate/verbalize independence in self-management of condition in : 6 weeks  Pt. will have improved quality of sleep: with less pain;getting 75-90% of required amount;in 6 weeks  Patient will decrease : NDI score;by _ points;for improved quality of function;in 6 weeks  by ___ points: 10   Pt will: demonstrate driving with <2/10 pain in L UE in 6 weeks.     Patient's expectations/goals are realistic.    Barriers to Learning or Achieving Goals:  No Barriers.       Plan / Patient Instructions:      Plan for next visit:myofascial release, cervical glides.      Subjective:        Numb in the end of the L 1st and 2nd fingers. Some N.T in forearm but not proximal to the elbow.     Functional limitations are described as occurring with:    - changing sleeping positions   - Driving.        Objective:      Treatment Today     TREATMENT MINUTES COMMENTS   Evaluation     Self-care/ Home management     Manual therapy 30 Supine L MFR to Flexor digitorum proximally at elbow and along interossei  Posterior radial glides to L forearm.    Neuromuscular Re-education     Therapeutic Activity     Therapeutic Exercises  Taught pt finger extension exercises with rubber band to balance flexor hypertrophy in L UE   Gait training     Modality__________________                Total 30    Blank areas are intentional and mean the treatment did not include these items.     HEP:                 Rosario Long  1/25/2017  2:34 PM

## 2021-06-25 NOTE — PROGRESS NOTES
Progress Notes by Rosario Long PT at 1/23/2017 11:00 AM     Author: Rosario Long PT Service: -- Author Type: Physical Therapist    Filed: 1/23/2017 11:51 AM Encounter Date: 1/23/2017 Status: Signed    : Rosario Long PT (Physical Therapist)       [facility]   Cervical Thoracic Follow Up    Patient Name: Ann Padilla  Date of evaluation: 1/23/2017  Referral Diagnosis: Cervical Radiculitis  Referring provider: Michele Almendarez MD  Visit: 4/10  Visit Diagnosis:     ICD-10-CM    1. Cervical radiculitis M54.12    2. Abnormal posture R29.3        Assessment:    Pt's L UE pain and tingling along lateral aspect of UE that is worst when sitting in the car. Pain in arm has significantly decreased with cortisone shot on Tuesday, with only numbness and tingling in L distal UE.   Myofascial restrictions in L pec, anterior scalene, SCM, and upper trap that when released this day pt noted no pain, numbness, or tingling in L UE at all. Pt able to demonstrate R sidebending properly to decrease N/T in L UE.  Pt would benefit from skilled physical therapy in order to address posture and muscle balance to decrease nerve pressure in LUE.     Pt. is appropriate for skilled PT intervention as outlined in the Plan of Care (POC).    Goals:  Pt. will demonstrate/verbalize independence in self-management of condition in : 6 weeks  Pt. will have improved quality of sleep: with less pain;getting 75-90% of required amount;in 6 weeks  Patient will decrease : NDI score;by _ points;for improved quality of function;in 6 weeks  by ___ points: 10   Pt will: demonstrate driving with <2/10 pain in L UE in 6 weeks.     Patient's expectations/goals are realistic.    Barriers to Learning or Achieving Goals:  No Barriers.       Plan / Patient Instructions:      Plan for next visit:myofascial release, cervical glides.      Subjective:        Pain is better with the cortisone shot on Tuesday. Numbness and tingling on L  side.   Neighbor drove her to her appointment this day.     Functional limitations are described as occurring with:    - changing sleeping positions   - Driving.        Objective:      Treatment Today     TREATMENT MINUTES COMMENTS   Evaluation     Self-care/ Home management     Manual therapy 30 Supine L pec MFR  Supine subclavius, anterior scalene, and SCM MFR.   Supine passive R side bending x 10 reps x 3 sets   L sidebending increased pt's symptoms in L UE   Neuromuscular Re-education     Therapeutic Activity     Therapeutic Exercises     Gait training     Modality__________________                Total 30    Blank areas are intentional and mean the treatment did not include these items.     HEP:                 Rosario Long  1/23/2017  2:34 PM

## 2021-06-27 ENCOUNTER — HEALTH MAINTENANCE LETTER (OUTPATIENT)
Age: 76
End: 2021-06-27

## 2021-07-13 ENCOUNTER — RECORDS - HEALTHEAST (OUTPATIENT)
Dept: ADMINISTRATIVE | Facility: CLINIC | Age: 76
End: 2021-07-13

## 2021-07-21 ENCOUNTER — RECORDS - HEALTHEAST (OUTPATIENT)
Dept: ADMINISTRATIVE | Facility: CLINIC | Age: 76
End: 2021-07-21

## 2021-07-22 ENCOUNTER — RECORDS - HEALTHEAST (OUTPATIENT)
Dept: FAMILY MEDICINE | Facility: CLINIC | Age: 76
End: 2021-07-22

## 2021-07-22 DIAGNOSIS — Z12.31 OTHER SCREENING MAMMOGRAM: ICD-10-CM

## 2021-07-23 ENCOUNTER — RECORDS - HEALTHEAST (OUTPATIENT)
Dept: ADMINISTRATIVE | Facility: CLINIC | Age: 76
End: 2021-07-23

## 2021-10-17 ENCOUNTER — HEALTH MAINTENANCE LETTER (OUTPATIENT)
Age: 76
End: 2021-10-17

## 2022-07-23 ENCOUNTER — HEALTH MAINTENANCE LETTER (OUTPATIENT)
Age: 77
End: 2022-07-23

## 2022-10-01 ENCOUNTER — HEALTH MAINTENANCE LETTER (OUTPATIENT)
Age: 77
End: 2022-10-01

## 2023-08-12 ENCOUNTER — HEALTH MAINTENANCE LETTER (OUTPATIENT)
Age: 78
End: 2023-08-12